# Patient Record
Sex: MALE | Race: WHITE | NOT HISPANIC OR LATINO | Employment: OTHER | URBAN - METROPOLITAN AREA
[De-identification: names, ages, dates, MRNs, and addresses within clinical notes are randomized per-mention and may not be internally consistent; named-entity substitution may affect disease eponyms.]

---

## 2024-08-15 ENCOUNTER — HOSPITAL ENCOUNTER (OUTPATIENT)
Dept: OTHER | Facility: HOSPITAL | Age: 69
Discharge: HOME | End: 2024-08-15

## 2024-08-22 ENCOUNTER — PATIENT ROUNDING (BHMG ONLY) (OUTPATIENT)
Dept: VASCULAR SURGERY | Facility: HOSPITAL | Age: 69
End: 2024-08-22
Payer: MEDICARE

## 2024-08-22 ENCOUNTER — OFFICE VISIT (OUTPATIENT)
Dept: VASCULAR SURGERY | Facility: HOSPITAL | Age: 69
End: 2024-08-22
Payer: MEDICARE

## 2024-08-22 VITALS
DIASTOLIC BLOOD PRESSURE: 98 MMHG | TEMPERATURE: 98 F | RESPIRATION RATE: 18 BRPM | HEART RATE: 77 BPM | SYSTOLIC BLOOD PRESSURE: 148 MMHG | OXYGEN SATURATION: 96 %

## 2024-08-22 DIAGNOSIS — I71.43 INFRARENAL ABDOMINAL AORTIC ANEURYSM (AAA) WITHOUT RUPTURE: Primary | ICD-10-CM

## 2024-08-22 PROCEDURE — G0463 HOSPITAL OUTPT CLINIC VISIT: HCPCS | Performed by: SURGERY

## 2024-08-22 RX ORDER — SACUBITRIL AND VALSARTAN 24; 26 MG/1; MG/1
1 TABLET, FILM COATED ORAL 2 TIMES DAILY
COMMUNITY

## 2024-08-22 RX ORDER — ASPIRIN 81 MG/1
81 TABLET ORAL DAILY
COMMUNITY

## 2024-08-22 RX ORDER — UREA 10 %
500 LOTION (ML) TOPICAL DAILY
COMMUNITY

## 2024-08-22 RX ORDER — CLOPIDOGREL BISULFATE 75 MG/1
1 TABLET ORAL DAILY
COMMUNITY

## 2024-08-22 RX ORDER — ROSUVASTATIN CALCIUM 40 MG/1
1 TABLET, COATED ORAL DAILY
COMMUNITY

## 2024-08-22 NOTE — PROGRESS NOTES
" [facility]   HISTORY AND PHYSICAL    Patient Name: Brenton Rogers  : 1955  MRN: [id number]  Primary Care Physician:  Louis Lea DO      Subjective   Subjective     Chief Complaint: 5.4 cm infrarenal abdominal aortic aneurysm    HPI:    Brenton Roegrs is a 69 y.o. male smoker found to have a 5.4 cm infrarenal abdominal aortic aneurysm on surveillance ultrasound at [facility].  Lives in [place], [place] almost 2 hours away.  He is otherwise in good health and exercises after an MI last year by Dr. Mariano in [place], [place] who [facility]  He stopped smoking last year as well.    Review of Systems    Non contributory except for the History of Present Illness    Personal History     Past Medical History:   Diagnosis Date    Aneurysm     Myocardial infarction        Past Surgical History:   Procedure Laterality Date    PROSTATECTOMY         Family History: family history is not on file. Otherwise pertinent FHx was reviewed and not pertinent to current issue.    Social History:  reports that he has quit smoking. His smoking use included cigarettes. He does not have any smokeless tobacco history on file. He reports current alcohol use. He reports that he does not currently use drugs after having used the following drugs: Marijuana, LSD, Amphetamines, \"Crack\" cocaine, and Methamphetamines.    Home Medications:  See list.      Allergies:  No Known Allergies    Objective   Objective     Vitals:   Temp:  [98 °F (36.7 °C)] 98 °F (36.7 °C)  Heart Rate:  [77] 77  Resp:  [18] 18  BP: (148)/(98) 148/98    Physical Exam    Constitutional: Awake, alert, NAD   Neck: Supple   Respiratory: Clear to auscultation bilaterally, nonlabored respirations    Cardiovascular: RRR, no murmurs   Abdomen: S/NT/ND, + BS   Extremities: symmetric with bilateral PT pulses palpable and B feet warm and viable    Result Review    Most notable findings include: Aortic duplex showing 5.4 cm " in transverse diameter.    Assessment & Plan   Assessment / Plan       There are no diagnoses linked to this encounter.     Assessment & Plan:   Brenton Rogers is a 69 y.o. male smoker found to have a 5.4 cm infrarenal abdominal aortic aneurysm on duplex.  2.  Past medical history significant for COPD, CHF, CAD, hypertension, dyslipidemia.  3.  Patient is on aspirin, Plavix, and Crestor.  4.  Will plan for a CT Angiogram of the aorta with iliofemoral runoff to check on true size of this infra-renal AAA and see if there is an indication for repair and any pre-operative planning.    Electronically signed by Aaron Escobedo MD, 08/22/24, 7:51 AM EDT.

## 2024-08-22 NOTE — PROGRESS NOTES
Our New Patient was asked Today after their initial visit how their experience was in our clinic. They stated they were very happy with the visit, knowledge and kindness of staff, and short wait times. They told us there wasn't anything at this time they could state we could do to improve their visit Today. We thanked them for the opportunity to take part in their care.

## 2024-09-17 ENCOUNTER — HOSPITAL ENCOUNTER (OUTPATIENT)
Dept: CT IMAGING | Facility: HOSPITAL | Age: 69
Discharge: HOME | End: 2024-09-17
Payer: MEDICARE

## 2024-09-17 ENCOUNTER — OFFICE VISIT (OUTPATIENT)
Dept: VASCULAR SURGERY | Facility: HOSPITAL | Age: 69
End: 2024-09-17
Payer: MEDICARE

## 2024-09-17 VITALS
SYSTOLIC BLOOD PRESSURE: 132 MMHG | RESPIRATION RATE: 18 BRPM | TEMPERATURE: 98.2 F | DIASTOLIC BLOOD PRESSURE: 70 MMHG | OXYGEN SATURATION: 98 % | HEART RATE: 60 BPM

## 2024-09-17 DIAGNOSIS — I71.43 INFRARENAL ABDOMINAL AORTIC ANEURYSM (AAA) WITHOUT RUPTURE: Primary | ICD-10-CM

## 2024-09-17 DIAGNOSIS — I71.43 INFRARENAL ABDOMINAL AORTIC ANEURYSM (AAA) WITHOUT RUPTURE: ICD-10-CM

## 2024-09-17 LAB
CREAT BLDA-MCNC: 1.2 MG/DL (ref 0.6–1.3)
EGFRCR SERPLBLD CKD-EPI 2021: 64.7 ML/MIN/1.73

## 2024-09-17 PROCEDURE — 99214 OFFICE O/P EST MOD 30 MIN: CPT | Performed by: SURGERY

## 2024-09-17 PROCEDURE — 75635 CT ANGIO ABDOMINAL ARTERIES: CPT

## 2024-09-17 PROCEDURE — 82565 ASSAY OF CREATININE: CPT

## 2024-09-17 PROCEDURE — 25510000001 IOPAMIDOL PER 1 ML: Performed by: SURGERY

## 2024-09-17 RX ORDER — IOPAMIDOL 755 MG/ML
125 INJECTION, SOLUTION INTRAVASCULAR
Status: COMPLETED | OUTPATIENT
Start: 2024-09-17 | End: 2024-09-17

## 2024-09-17 RX ADMIN — IOPAMIDOL 125 ML: 755 INJECTION, SOLUTION INTRAVENOUS at 16:07

## 2024-09-20 ENCOUNTER — PREP FOR SURGERY (OUTPATIENT)
Dept: OTHER | Facility: HOSPITAL | Age: 69
End: 2024-09-20
Payer: MEDICARE

## 2024-09-20 DIAGNOSIS — I71.43 INFRARENAL ABDOMINAL AORTIC ANEURYSM (AAA) WITHOUT RUPTURE: Primary | ICD-10-CM

## 2024-09-20 PROBLEM — I71.40 AAA (ABDOMINAL AORTIC ANEURYSM) WITHOUT RUPTURE: Status: ACTIVE | Noted: 2024-09-20

## 2024-09-20 RX ORDER — SODIUM CHLORIDE 0.9 % (FLUSH) 0.9 %
10 SYRINGE (ML) INJECTION AS NEEDED
OUTPATIENT
Start: 2024-09-20

## 2024-09-20 RX ORDER — SODIUM CHLORIDE 9 MG/ML
40 INJECTION, SOLUTION INTRAVENOUS AS NEEDED
OUTPATIENT
Start: 2024-09-20

## 2024-09-20 RX ORDER — SODIUM CHLORIDE 0.9 % (FLUSH) 0.9 %
10 SYRINGE (ML) INJECTION EVERY 12 HOURS SCHEDULED
OUTPATIENT
Start: 2024-09-20

## 2024-10-03 ENCOUNTER — HOSPITAL ENCOUNTER (OUTPATIENT)
Dept: OTHER | Facility: HOSPITAL | Age: 69
Discharge: HOME | End: 2024-10-03
Payer: MEDICARE

## 2024-10-18 ENCOUNTER — ANESTHESIA EVENT (OUTPATIENT)
Dept: PERIOP | Facility: HOSPITAL | Age: 69
End: 2024-10-18
Payer: MEDICARE

## 2024-10-18 RX ORDER — HYDROCODONE BITARTRATE AND ACETAMINOPHEN 10; 325 MG/1; MG/1
1 TABLET ORAL EVERY 6 HOURS PRN
COMMUNITY

## 2024-10-18 NOTE — PRE-PROCEDURE INSTRUCTIONS
PATIENT INSTRUCTED TO BE:    - NOTHING TO EAT AFTER MIDNIGHT OR CHEW, EXCEPT SIPS OF WATER MEDICATIONS    - TO HOLD ALL VITAMINS, SUPPLEMENTS, NSAIDS FOR ONE WEEK PRIOR TO THEIR SURGICAL PROCEDURE    - DO NOT TAKE __JARDIANCE 3 DAYS PRIOR TO PROCEDURE PER ANESTHESIA RECOMMENDATIONS/INSTRUCTIONS ( PT STATED ALREADY STOPPED LAST DOSE 10-16-24)    - INSTRUCTED PT TO USE SURGICAL SOAP 1 TIME THE NIGHT PRIOR TO SURGERY _[id number]____ OR THE AM OF SURGERY __[id number]_____   USE THE SOAP FROM NECK TO TOES, AVOID THEIR FACE, HAIR, AND PRIVATE PARTS. IF USE THE SOAP THE NIGHT PRIOR TO SURGERY, CHANGE BED LINENS AND NO PETS IN THE BED.     INSTRUCTED NO LOTIONS, JEWELRY, PIERCINGS,  NAIL POLISH, OR DEODORANT DAY OF SURGERY    - IF DIABETIC, CHECK BLOOD GLUCOSE IF LESS THAN 70 OR HAVING SYMPTOMS CALL THE PREOP AREA FOR INSTRUCTIONS ON AM OF SURGERY ([phone] )    -INSTRUCTED TO TAKE THE FOLLOWING MEDICATIONS THE DAY OF SURGERY WITH SIPS OF WATER:       NORCO IF NEEDED, CRESTOR, PLAVIX, ASA      - DO NOT BRING ANY MEDICATIONS WITH YOU TO THE HOSPITAL THE DAY OF SURGERY, EXCEPT IF USE INHALERS. BRING INHALERS DAY OF SURGERY       - BRING CPAP OR BIPAP TO THE HOSPITAL ONLY IF YOU ARE SPENDING THE NIGHT    - DO NOT SMOKE OR VAPE 24 HOURS PRIOR TO PROCEDURE PER ANESTHESIA REQUEST     -MAKE SURE YOU HAVE A RIDE HOME OR SOMEONE TO STAY WITH YOU THE DAY OF THE PROCEDURE AFTER YOU GO HOME     - FOLLOW ANY OTHER INSTRUCTIONS GIVEN TO YOU BY YOUR SURGEON'S OFFICE.     - DAY OF SURGERY _10-22-24, COME TO ELEVATOR A, THIRD FLOOR, CHECK IN AT THE DESK FOR REGISTRATION/SURGERY    - YOU WILL RECEIVE A PHONE CALL THE DAY PRIOR TO SURGERY BETWEEN 1PM AND 4 PM WITH ARRIVAL TIME, IF YOUR SURGERY IS ON A MONDAY YOU WILL RECEIVE A CALL THE FRIDAY PRIOR TO SURGERY DATE    - BRING CASH OR CREDIT CARD FOR COPAYMENT OF MEDICATIONS AFTER SURGERY IF YOU USE THE HOSPITAL PHARMACY (MEDS TO BED)    - PREADMISSION TESTING NURSE LINA ZAMORA -[phone] IF  HAVE ANY QUESTIONS     -PATIENT PROVIDED THE NUMBER FOR PREOP SURGICAL DEPT IF HAD QUESTIONS AFTER HOURS PRIOR TO SURGERY ([phone] ).  INFORMED PT IF NO ANSWER, LEAVE A MESSAGE AND SOMEONE WILL RETURN THEIR CALL      PER DR MONTOYA OFFICE OK TO GET LABS AND TESTING DONE DAY OF SURGERY     PER DR MONTOYA OFFICE OK TO CONTINUE PLAVIX AND ASPIRIN AND OK TO TAKE DAY OF SURGERY.     PATIENT VERBALIZED UNDERSTANDING

## 2024-10-18 NOTE — NURSING NOTE
PER KESHIA AT DR MONTOYA OFFICE OK FOR PATIENT TO CONTINUE TAKING ASA AND PLAVIX FOR SURGERY, OK TO TAKE DAY OF PROCEDURE. PT AWARE AND VOICED UNDERSANDING.

## 2024-10-22 ENCOUNTER — APPOINTMENT (OUTPATIENT)
Dept: GENERAL RADIOLOGY | Facility: HOSPITAL | Age: 69
End: 2024-10-22
Payer: MEDICARE

## 2024-10-22 ENCOUNTER — HOSPITAL ENCOUNTER (INPATIENT)
Facility: HOSPITAL | Age: 69
LOS: 2 days | Discharge: HOME | End: 2024-10-24
Attending: SURGERY | Admitting: SURGERY
Payer: MEDICARE

## 2024-10-22 ENCOUNTER — ANESTHESIA (OUTPATIENT)
Dept: PERIOP | Facility: HOSPITAL | Age: 69
End: 2024-10-22
Payer: MEDICARE

## 2024-10-22 DIAGNOSIS — I71.43 INFRARENAL ABDOMINAL AORTIC ANEURYSM (AAA) WITHOUT RUPTURE: ICD-10-CM

## 2024-10-22 DIAGNOSIS — R26.2 DIFFICULTY IN WALKING: Primary | ICD-10-CM

## 2024-10-22 LAB
ABO GROUP BLD: NORMAL
ABO GROUP BLD: NORMAL
ALBUMIN SERPL-MCNC: 4.6 G/DL (ref 3.5–5.2)
ALBUMIN/GLOB SERPL: 1.4 G/DL
ALP SERPL-CCNC: 77 U/L (ref 39–117)
ALT SERPL W P-5'-P-CCNC: 39 U/L (ref 1–41)
ANION GAP SERPL CALCULATED.3IONS-SCNC: 10.9 MMOL/L (ref 5–15)
APTT PPP: 25.4 SECONDS (ref 24.2–34.2)
ARTERIAL PATENCY WRIST A: ABNORMAL
AST SERPL-CCNC: 26 U/L (ref 1–40)
ATMOSPHERIC PRESS: 749.2 MMHG
BASE EXCESS BLDA CALC-SCNC: -3.3 MMOL/L (ref -2–2)
BASOPHILS # BLD AUTO: 0.05 10*3/MM3 (ref 0–0.2)
BASOPHILS NFR BLD AUTO: 0.4 % (ref 0–1.5)
BDY SITE: ABNORMAL
BILIRUB SERPL-MCNC: 0.4 MG/DL (ref 0–1.2)
BLD GP AB SCN SERPL QL: NEGATIVE
BUN SERPL-MCNC: 20 MG/DL (ref 8–23)
BUN/CREAT SERPL: 17.2 (ref 7–25)
CA-I BLDA-SCNC: 1.12 MMOL/L (ref 1.13–1.32)
CALCIUM SPEC-SCNC: 9.7 MG/DL (ref 8.6–10.5)
CHLORIDE BLDA-SCNC: 105 MMOL/L (ref 98–107)
CHLORIDE SERPL-SCNC: 99 MMOL/L (ref 98–107)
CO2 SERPL-SCNC: 25.1 MMOL/L (ref 22–29)
CREAT SERPL-MCNC: 1.16 MG/DL (ref 0.76–1.27)
D-LACTATE SERPL-SCNC: 1.1 MMOL/L
DEPRECATED RDW RBC AUTO: 45.2 FL (ref 37–54)
DEVICE COMMENT: ABNORMAL
EGFRCR SERPLBLD CKD-EPI 2021: 67.3 ML/MIN/1.73
EOSINOPHIL # BLD AUTO: 0.12 10*3/MM3 (ref 0–0.4)
EOSINOPHIL NFR BLD AUTO: 1 % (ref 0.3–6.2)
ERYTHROCYTE [DISTWIDTH] IN BLOOD BY AUTOMATED COUNT: 13.2 % (ref 12.3–15.4)
GLOBULIN UR ELPH-MCNC: 3.2 GM/DL
GLUCOSE BLDC GLUCOMTR-MCNC: 100 MG/DL (ref 70–99)
GLUCOSE BLDC GLUCOMTR-MCNC: 103 MG/DL (ref 70–99)
GLUCOSE SERPL-MCNC: 90 MG/DL (ref 65–99)
HCO3 BLDA-SCNC: 21.9 MMOL/L (ref 22–26)
HCT VFR BLD AUTO: 44.9 % (ref 37.5–51)
HCT VFR BLD CALC: 33 % (ref 38–51)
HEMODILUTION: NO
HGB BLD-MCNC: 14.9 G/DL (ref 13–17.7)
HGB BLDA-MCNC: 11.3 G/DL (ref 12–18)
IMM GRANULOCYTES # BLD AUTO: 0.08 10*3/MM3 (ref 0–0.05)
IMM GRANULOCYTES NFR BLD AUTO: 0.7 % (ref 0–0.5)
INHALED O2 CONCENTRATION: 60 %
INR PPP: 0.94 (ref 0.86–1.15)
LYMPHOCYTES # BLD AUTO: 4.62 10*3/MM3 (ref 0.7–3.1)
LYMPHOCYTES NFR BLD AUTO: 40 % (ref 19.6–45.3)
MAGNESIUM SERPL-MCNC: 2.1 MG/DL (ref 1.6–2.4)
MCH RBC QN AUTO: 31.1 PG (ref 26.6–33)
MCHC RBC AUTO-ENTMCNC: 33.2 G/DL (ref 31.5–35.7)
MCV RBC AUTO: 93.7 FL (ref 79–97)
MODALITY: ABNORMAL
MONOCYTES # BLD AUTO: 0.71 10*3/MM3 (ref 0.1–0.9)
MONOCYTES NFR BLD AUTO: 6.1 % (ref 5–12)
NEUTROPHILS NFR BLD AUTO: 5.97 10*3/MM3 (ref 1.7–7)
NEUTROPHILS NFR BLD AUTO: 51.8 % (ref 42.7–76)
NRBC BLD AUTO-RTO: 0 /100 WBC (ref 0–0.2)
PCO2 BLDA: 39.4 MM HG (ref 35–45)
PH BLDA: 7.35 PH UNITS (ref 7.35–7.45)
PHOSPHATE SERPL-MCNC: 3.7 MG/DL (ref 2.5–4.5)
PLATELET # BLD AUTO: 175 10*3/MM3 (ref 140–450)
PMV BLD AUTO: 10 FL (ref 6–12)
PO2 BLD: 376 MM[HG] (ref 0–500)
PO2 BLDA: 225.6 MM HG (ref 80–100)
POTASSIUM BLDA-SCNC: 4 MMOL/L (ref 3.5–5)
POTASSIUM SERPL-SCNC: 4.2 MMOL/L (ref 3.5–5.2)
PROT SERPL-MCNC: 7.8 G/DL (ref 6–8.5)
PROTHROMBIN TIME: 12.8 SECONDS (ref 11.8–14.9)
QT INTERVAL: 366 MS
QTC INTERVAL: 384 MS
RBC # BLD AUTO: 4.79 10*6/MM3 (ref 4.14–5.8)
RH BLD: POSITIVE
RH BLD: POSITIVE
SAO2 % BLDCOA: 99.8 % (ref 95–99)
SODIUM BLD-SCNC: 140 MMOL/L (ref 131–143)
SODIUM SERPL-SCNC: 135 MMOL/L (ref 136–145)
T&S EXPIRATION DATE: NORMAL
VENTILATOR MODE: AC
WBC NRBC COR # BLD AUTO: 11.55 10*3/MM3 (ref 3.4–10.8)

## 2024-10-22 PROCEDURE — 25010000002 PHENYLEPHRINE 10 MG/ML SOLUTION 5 ML VIAL: Performed by: NURSE ANESTHETIST, CERTIFIED REGISTERED

## 2024-10-22 PROCEDURE — 25010000002 BUPIVACAINE (PF) 0.25 % SOLUTION 10 ML VIAL: Performed by: SURGERY

## 2024-10-22 PROCEDURE — 25810000003 SODIUM CHLORIDE 0.9 % SOLUTION 250 ML FLEX CONT: Performed by: NURSE ANESTHETIST, CERTIFIED REGISTERED

## 2024-10-22 PROCEDURE — 82948 REAGENT STRIP/BLOOD GLUCOSE: CPT

## 2024-10-22 PROCEDURE — 80051 ELECTROLYTE PANEL: CPT

## 2024-10-22 PROCEDURE — 25810000003 SODIUM CHLORIDE 0.9 % SOLUTION: Performed by: SURGERY

## 2024-10-22 PROCEDURE — C1874 STENT, COATED/COV W/DEL SYS: HCPCS | Performed by: SURGERY

## 2024-10-22 PROCEDURE — 86900 BLOOD TYPING SEROLOGIC ABO: CPT | Performed by: SURGERY

## 2024-10-22 PROCEDURE — 25010000002 FENTANYL CITRATE (PF) 50 MCG/ML SOLUTION: Performed by: NURSE ANESTHETIST, CERTIFIED REGISTERED

## 2024-10-22 PROCEDURE — 86901 BLOOD TYPING SEROLOGIC RH(D): CPT | Performed by: SURGERY

## 2024-10-22 PROCEDURE — 25010000002 HEPARIN (PORCINE) PER 1000 UNITS: Performed by: SURGERY

## 2024-10-22 PROCEDURE — C1894 INTRO/SHEATH, NON-LASER: HCPCS | Performed by: SURGERY

## 2024-10-22 PROCEDURE — S0260 H&P FOR SURGERY: HCPCS | Performed by: SURGERY

## 2024-10-22 PROCEDURE — 25010000002 ONDANSETRON PER 1 MG: Performed by: NURSE ANESTHETIST, CERTIFIED REGISTERED

## 2024-10-22 PROCEDURE — 0 IOVERSOL 68 % SOLUTION: Performed by: SURGERY

## 2024-10-22 PROCEDURE — C1889 IMPLANT/INSERT DEVICE, NOC: HCPCS | Performed by: SURGERY

## 2024-10-22 PROCEDURE — 84100 ASSAY OF PHOSPHORUS: CPT | Performed by: PHYSICIAN ASSISTANT

## 2024-10-22 PROCEDURE — C1769 GUIDE WIRE: HCPCS | Performed by: SURGERY

## 2024-10-22 PROCEDURE — 04V03DZ RESTRICTION OF ABDOMINAL AORTA WITH INTRALUMINAL DEVICE, PERCUTANEOUS APPROACH: ICD-10-PCS | Performed by: SURGERY

## 2024-10-22 PROCEDURE — 34705 EVAC RPR A-BIILIAC NDGFT: CPT | Performed by: SURGERY

## 2024-10-22 PROCEDURE — 25010000002 CEFAZOLIN PER 500 MG: Performed by: SURGERY

## 2024-10-22 PROCEDURE — C1887 CATHETER, GUIDING: HCPCS | Performed by: SURGERY

## 2024-10-22 PROCEDURE — 25010000002 ALBUMIN HUMAN 5% PER 50 ML: Performed by: NURSE ANESTHETIST, CERTIFIED REGISTERED

## 2024-10-22 PROCEDURE — 76000 FLUOROSCOPY <1 HR PHYS/QHP: CPT

## 2024-10-22 PROCEDURE — 85730 THROMBOPLASTIN TIME PARTIAL: CPT | Performed by: SURGERY

## 2024-10-22 PROCEDURE — 25010000002 PROPOFOL 10 MG/ML EMULSION: Performed by: NURSE ANESTHETIST, CERTIFIED REGISTERED

## 2024-10-22 PROCEDURE — 86850 RBC ANTIBODY SCREEN: CPT | Performed by: SURGERY

## 2024-10-22 PROCEDURE — 82803 BLOOD GASES ANY COMBINATION: CPT

## 2024-10-22 PROCEDURE — 80053 COMPREHEN METABOLIC PANEL: CPT | Performed by: ANESTHESIOLOGY

## 2024-10-22 PROCEDURE — 34713 PERQ ACCESS & CLSR FEM ART: CPT | Performed by: SURGERY

## 2024-10-22 PROCEDURE — 71045 X-RAY EXAM CHEST 1 VIEW: CPT

## 2024-10-22 PROCEDURE — 83605 ASSAY OF LACTIC ACID: CPT

## 2024-10-22 PROCEDURE — P9041 ALBUMIN (HUMAN),5%, 50ML: HCPCS | Performed by: NURSE ANESTHETIST, CERTIFIED REGISTERED

## 2024-10-22 PROCEDURE — 82330 ASSAY OF CALCIUM: CPT

## 2024-10-22 PROCEDURE — 85025 COMPLETE CBC W/AUTO DIFF WBC: CPT | Performed by: SURGERY

## 2024-10-22 PROCEDURE — 85610 PROTHROMBIN TIME: CPT | Performed by: SURGERY

## 2024-10-22 PROCEDURE — 25010000002 DEXAMETHASONE PER 1 MG: Performed by: NURSE ANESTHETIST, CERTIFIED REGISTERED

## 2024-10-22 PROCEDURE — 25010000002 HEPARIN (PORCINE) PER 1000 UNITS: Performed by: NURSE ANESTHETIST, CERTIFIED REGISTERED

## 2024-10-22 PROCEDURE — 34705 EVAC RPR A-BIILIAC NDGFT: CPT

## 2024-10-22 PROCEDURE — 25010000002 SUGAMMADEX 200 MG/2ML SOLUTION: Performed by: NURSE ANESTHETIST, CERTIFIED REGISTERED

## 2024-10-22 PROCEDURE — C2628 CATHETER, OCCLUSION: HCPCS | Performed by: SURGERY

## 2024-10-22 PROCEDURE — 25010000002 MIDAZOLAM PER 1MG: Performed by: ANESTHESIOLOGY

## 2024-10-22 PROCEDURE — 86901 BLOOD TYPING SEROLOGIC RH(D): CPT

## 2024-10-22 PROCEDURE — 34713 PERQ ACCESS & CLSR FEM ART: CPT

## 2024-10-22 PROCEDURE — 86900 BLOOD TYPING SEROLOGIC ABO: CPT

## 2024-10-22 PROCEDURE — 25010000002 LIDOCAINE 1 % SOLUTION 20 ML VIAL: Performed by: SURGERY

## 2024-10-22 PROCEDURE — 93005 ELECTROCARDIOGRAM TRACING: CPT | Performed by: ANESTHESIOLOGY

## 2024-10-22 PROCEDURE — C1760 CLOSURE DEV, VASC: HCPCS | Performed by: SURGERY

## 2024-10-22 PROCEDURE — 25810000003 SODIUM CHLORIDE 0.9 % SOLUTION: Performed by: NURSE ANESTHETIST, CERTIFIED REGISTERED

## 2024-10-22 PROCEDURE — 25010000002 MAGNESIUM SULFATE PER 500 MG OF MAGNESIUM: Performed by: NURSE ANESTHETIST, CERTIFIED REGISTERED

## 2024-10-22 PROCEDURE — 83735 ASSAY OF MAGNESIUM: CPT | Performed by: PHYSICIAN ASSISTANT

## 2024-10-22 PROCEDURE — 25010000002 LIDOCAINE PF 2% 2 % SOLUTION: Performed by: NURSE ANESTHETIST, CERTIFIED REGISTERED

## 2024-10-22 PROCEDURE — 25810000003 LACTATED RINGERS PER 1000 ML: Performed by: ANESTHESIOLOGY

## 2024-10-22 DEVICE — GRFT EXCLDR CONTRALAT 12F 16MM 9.5CM: Type: IMPLANTABLE DEVICE | Site: GROIN | Status: FUNCTIONAL

## 2024-10-22 DEVICE — STENTGR ENDOPRSTH AAA EXCLUDER I/LAT TRNK 16F 14.5X26MM 12CM: Type: IMPLANTABLE DEVICE | Site: GROIN | Status: FUNCTIONAL

## 2024-10-22 DEVICE — GRFT EXCLDR CONTRALAT 12MMX10CM: Type: IMPLANTABLE DEVICE | Site: GROIN | Status: FUNCTIONAL

## 2024-10-22 RX ORDER — OXYCODONE HYDROCHLORIDE 5 MG/1
5 TABLET ORAL
Status: DISCONTINUED | OUTPATIENT
Start: 2024-10-22 | End: 2024-10-22 | Stop reason: HOSPADM

## 2024-10-22 RX ORDER — EPHEDRINE SULFATE 50 MG/ML
INJECTION INTRAVENOUS AS NEEDED
Status: DISCONTINUED | OUTPATIENT
Start: 2024-10-22 | End: 2024-10-22 | Stop reason: SURG

## 2024-10-22 RX ORDER — ONDANSETRON 2 MG/ML
4 INJECTION INTRAMUSCULAR; INTRAVENOUS ONCE AS NEEDED
Status: DISCONTINUED | OUTPATIENT
Start: 2024-10-22 | End: 2024-10-22 | Stop reason: HOSPADM

## 2024-10-22 RX ORDER — ACETAMINOPHEN 325 MG/1
650 TABLET ORAL EVERY 4 HOURS PRN
Status: DISCONTINUED | OUTPATIENT
Start: 2024-10-22 | End: 2024-10-24 | Stop reason: HOSPADM

## 2024-10-22 RX ORDER — MIDAZOLAM HYDROCHLORIDE 2 MG/2ML
2 INJECTION, SOLUTION INTRAMUSCULAR; INTRAVENOUS ONCE
Status: COMPLETED | OUTPATIENT
Start: 2024-10-22 | End: 2024-10-22

## 2024-10-22 RX ORDER — PROMETHAZINE HYDROCHLORIDE 25 MG/1
25 SUPPOSITORY RECTAL ONCE AS NEEDED
Status: DISCONTINUED | OUTPATIENT
Start: 2024-10-22 | End: 2024-10-22 | Stop reason: HOSPADM

## 2024-10-22 RX ORDER — ROCURONIUM BROMIDE 10 MG/ML
INJECTION, SOLUTION INTRAVENOUS AS NEEDED
Status: DISCONTINUED | OUTPATIENT
Start: 2024-10-22 | End: 2024-10-22 | Stop reason: SURG

## 2024-10-22 RX ORDER — ONDANSETRON 2 MG/ML
INJECTION INTRAMUSCULAR; INTRAVENOUS AS NEEDED
Status: DISCONTINUED | OUTPATIENT
Start: 2024-10-22 | End: 2024-10-22 | Stop reason: SURG

## 2024-10-22 RX ORDER — SODIUM CHLORIDE 0.9 % (FLUSH) 0.9 %
10 SYRINGE (ML) INJECTION AS NEEDED
Status: DISCONTINUED | OUTPATIENT
Start: 2024-10-22 | End: 2024-10-22 | Stop reason: HOSPADM

## 2024-10-22 RX ORDER — HYDROCODONE BITARTRATE AND ACETAMINOPHEN 10; 325 MG/1; MG/1
1 TABLET ORAL EVERY 6 HOURS PRN
Status: DISCONTINUED | OUTPATIENT
Start: 2024-10-22 | End: 2024-10-24 | Stop reason: HOSPADM

## 2024-10-22 RX ORDER — ACETAMINOPHEN 500 MG
1000 TABLET ORAL ONCE
Status: COMPLETED | OUTPATIENT
Start: 2024-10-22 | End: 2024-10-22

## 2024-10-22 RX ORDER — CEFAZOLIN SODIUM 1 G/3ML
1 INJECTION, POWDER, FOR SOLUTION INTRAMUSCULAR; INTRAVENOUS EVERY 8 HOURS
Status: DISCONTINUED | OUTPATIENT
Start: 2024-10-22 | End: 2024-10-22

## 2024-10-22 RX ORDER — LIDOCAINE HYDROCHLORIDE 20 MG/ML
INJECTION, SOLUTION EPIDURAL; INFILTRATION; INTRACAUDAL; PERINEURAL AS NEEDED
Status: DISCONTINUED | OUTPATIENT
Start: 2024-10-22 | End: 2024-10-22 | Stop reason: SURG

## 2024-10-22 RX ORDER — HEPARIN SODIUM 5000 [USP'U]/ML
5000 INJECTION, SOLUTION INTRAVENOUS; SUBCUTANEOUS EVERY 8 HOURS SCHEDULED
Status: DISCONTINUED | OUTPATIENT
Start: 2024-10-23 | End: 2024-10-23

## 2024-10-22 RX ORDER — HEPARIN SODIUM 1000 [USP'U]/ML
INJECTION, SOLUTION INTRAVENOUS; SUBCUTANEOUS AS NEEDED
Status: DISCONTINUED | OUTPATIENT
Start: 2024-10-22 | End: 2024-10-22 | Stop reason: SURG

## 2024-10-22 RX ORDER — DEXMEDETOMIDINE HYDROCHLORIDE 100 UG/ML
INJECTION, SOLUTION INTRAVENOUS AS NEEDED
Status: DISCONTINUED | OUTPATIENT
Start: 2024-10-22 | End: 2024-10-22 | Stop reason: SURG

## 2024-10-22 RX ORDER — SODIUM CHLORIDE, SODIUM LACTATE, POTASSIUM CHLORIDE, CALCIUM CHLORIDE 600; 310; 30; 20 MG/100ML; MG/100ML; MG/100ML; MG/100ML
9 INJECTION, SOLUTION INTRAVENOUS CONTINUOUS PRN
Status: ACTIVE | OUTPATIENT
Start: 2024-10-22 | End: 2024-10-23

## 2024-10-22 RX ORDER — CLOPIDOGREL BISULFATE 75 MG/1
75 TABLET ORAL DAILY
Status: DISCONTINUED | OUTPATIENT
Start: 2024-10-23 | End: 2024-10-24 | Stop reason: HOSPADM

## 2024-10-22 RX ORDER — SODIUM CHLORIDE 9 MG/ML
INJECTION, SOLUTION INTRAVENOUS CONTINUOUS PRN
Status: DISCONTINUED | OUTPATIENT
Start: 2024-10-22 | End: 2024-10-22 | Stop reason: SURG

## 2024-10-22 RX ORDER — PROPOFOL 10 MG/ML
VIAL (ML) INTRAVENOUS AS NEEDED
Status: DISCONTINUED | OUTPATIENT
Start: 2024-10-22 | End: 2024-10-22 | Stop reason: SURG

## 2024-10-22 RX ORDER — DEXAMETHASONE SODIUM PHOSPHATE 4 MG/ML
INJECTION, SOLUTION INTRA-ARTICULAR; INTRALESIONAL; INTRAMUSCULAR; INTRAVENOUS; SOFT TISSUE AS NEEDED
Status: DISCONTINUED | OUTPATIENT
Start: 2024-10-22 | End: 2024-10-22 | Stop reason: SURG

## 2024-10-22 RX ORDER — NITROGLYCERIN 0.4 MG/1
0.4 TABLET SUBLINGUAL
Status: DISCONTINUED | OUTPATIENT
Start: 2024-10-22 | End: 2024-10-24 | Stop reason: HOSPADM

## 2024-10-22 RX ORDER — MORPHINE SULFATE 2 MG/ML
2 INJECTION, SOLUTION INTRAMUSCULAR; INTRAVENOUS
Status: DISCONTINUED | OUTPATIENT
Start: 2024-10-22 | End: 2024-10-24 | Stop reason: HOSPADM

## 2024-10-22 RX ORDER — SODIUM CHLORIDE 9 MG/ML
40 INJECTION, SOLUTION INTRAVENOUS AS NEEDED
Status: DISCONTINUED | OUTPATIENT
Start: 2024-10-22 | End: 2024-10-22 | Stop reason: HOSPADM

## 2024-10-22 RX ORDER — PHENYLEPHRINE HCL IN 0.9% NACL 1 MG/10 ML
SYRINGE (ML) INTRAVENOUS AS NEEDED
Status: DISCONTINUED | OUTPATIENT
Start: 2024-10-22 | End: 2024-10-22 | Stop reason: SURG

## 2024-10-22 RX ORDER — SODIUM CHLORIDE 0.9 % (FLUSH) 0.9 %
10 SYRINGE (ML) INJECTION EVERY 12 HOURS SCHEDULED
Status: DISCONTINUED | OUTPATIENT
Start: 2024-10-22 | End: 2024-10-22 | Stop reason: HOSPADM

## 2024-10-22 RX ORDER — FENTANYL CITRATE 50 UG/ML
INJECTION, SOLUTION INTRAMUSCULAR; INTRAVENOUS AS NEEDED
Status: DISCONTINUED | OUTPATIENT
Start: 2024-10-22 | End: 2024-10-22 | Stop reason: SURG

## 2024-10-22 RX ORDER — MAGNESIUM SULFATE HEPTAHYDRATE 500 MG/ML
INJECTION, SOLUTION INTRAMUSCULAR; INTRAVENOUS AS NEEDED
Status: DISCONTINUED | OUTPATIENT
Start: 2024-10-22 | End: 2024-10-22 | Stop reason: SURG

## 2024-10-22 RX ORDER — NALOXONE HCL 0.4 MG/ML
0.4 VIAL (ML) INJECTION
Status: DISCONTINUED | OUTPATIENT
Start: 2024-10-22 | End: 2024-10-24 | Stop reason: HOSPADM

## 2024-10-22 RX ORDER — ALBUMIN, HUMAN INJ 5% 5 %
SOLUTION INTRAVENOUS CONTINUOUS PRN
Status: DISCONTINUED | OUTPATIENT
Start: 2024-10-22 | End: 2024-10-22 | Stop reason: SURG

## 2024-10-22 RX ORDER — ROSUVASTATIN CALCIUM 20 MG/1
40 TABLET, COATED ORAL DAILY
Status: DISCONTINUED | OUTPATIENT
Start: 2024-10-23 | End: 2024-10-24 | Stop reason: HOSPADM

## 2024-10-22 RX ORDER — SODIUM CHLORIDE 9 MG/ML
50 INJECTION, SOLUTION INTRAVENOUS CONTINUOUS
Status: ACTIVE | OUTPATIENT
Start: 2024-10-22 | End: 2024-10-22

## 2024-10-22 RX ORDER — PROMETHAZINE HYDROCHLORIDE 12.5 MG/1
25 TABLET ORAL ONCE AS NEEDED
Status: DISCONTINUED | OUTPATIENT
Start: 2024-10-22 | End: 2024-10-22 | Stop reason: HOSPADM

## 2024-10-22 RX ORDER — ASPIRIN 81 MG/1
81 TABLET ORAL DAILY
Status: DISCONTINUED | OUTPATIENT
Start: 2024-10-23 | End: 2024-10-24 | Stop reason: HOSPADM

## 2024-10-22 RX ADMIN — SODIUM CHLORIDE 2 G: 9 INJECTION, SOLUTION INTRAVENOUS at 07:38

## 2024-10-22 RX ADMIN — ALBUMIN (HUMAN): 12.5 INJECTION, SOLUTION INTRAVENOUS at 08:01

## 2024-10-22 RX ADMIN — ROCURONIUM BROMIDE 50 MG: 10 INJECTION, SOLUTION INTRAVENOUS at 07:36

## 2024-10-22 RX ADMIN — HEPARIN SODIUM 6000 UNITS: 1000 INJECTION INTRAVENOUS; SUBCUTANEOUS at 09:16

## 2024-10-22 RX ADMIN — SACUBITRIL AND VALSARTAN 1 TABLET: 24; 26 TABLET, FILM COATED ORAL at 20:42

## 2024-10-22 RX ADMIN — ROCURONIUM BROMIDE 20 MG: 10 INJECTION, SOLUTION INTRAVENOUS at 08:32

## 2024-10-22 RX ADMIN — HYDROCODONE BITARTRATE AND ACETAMINOPHEN 1 TABLET: 10; 325 TABLET ORAL at 20:57

## 2024-10-22 RX ADMIN — FENTANYL CITRATE 50 MCG: 50 INJECTION, SOLUTION INTRAMUSCULAR; INTRAVENOUS at 09:13

## 2024-10-22 RX ADMIN — DEXAMETHASONE SODIUM PHOSPHATE 4 MG: 4 INJECTION, SOLUTION INTRAMUSCULAR; INTRAVENOUS at 07:38

## 2024-10-22 RX ADMIN — PROPOFOL 150 MG: 10 INJECTION, EMULSION INTRAVENOUS at 07:36

## 2024-10-22 RX ADMIN — SODIUM CHLORIDE, POTASSIUM CHLORIDE, SODIUM LACTATE AND CALCIUM CHLORIDE 9 ML/HR: 600; 310; 30; 20 INJECTION, SOLUTION INTRAVENOUS at 07:09

## 2024-10-22 RX ADMIN — Medication 100 MCG: at 10:40

## 2024-10-22 RX ADMIN — MIDAZOLAM HYDROCHLORIDE 2 MG: 1 INJECTION, SOLUTION INTRAMUSCULAR; INTRAVENOUS at 07:10

## 2024-10-22 RX ADMIN — Medication 100 MCG: at 10:47

## 2024-10-22 RX ADMIN — FENTANYL CITRATE 50 MCG: 50 INJECTION, SOLUTION INTRAMUSCULAR; INTRAVENOUS at 08:32

## 2024-10-22 RX ADMIN — DEXMEDETOMIDINE HYDROCHLORIDE 4 MCG: 100 INJECTION, SOLUTION, CONCENTRATE INTRAVENOUS at 10:17

## 2024-10-22 RX ADMIN — Medication 100 MCG: at 08:05

## 2024-10-22 RX ADMIN — Medication 10 ML: at 12:46

## 2024-10-22 RX ADMIN — Medication 100 MCG: at 10:33

## 2024-10-22 RX ADMIN — Medication 100 MCG: at 10:26

## 2024-10-22 RX ADMIN — SODIUM CHLORIDE 50 ML/HR: 9 INJECTION, SOLUTION INTRAVENOUS at 12:34

## 2024-10-22 RX ADMIN — ONDANSETRON HYDROCHLORIDE 4 MG: 2 SOLUTION INTRAMUSCULAR; INTRAVENOUS at 10:30

## 2024-10-22 RX ADMIN — SUGAMMADEX 200 MG: 100 INJECTION, SOLUTION INTRAVENOUS at 10:45

## 2024-10-22 RX ADMIN — LIDOCAINE HYDROCHLORIDE 80 MG: 20 INJECTION, SOLUTION INTRAVENOUS at 07:36

## 2024-10-22 RX ADMIN — Medication 100 MCG: at 08:42

## 2024-10-22 RX ADMIN — PHENYLEPHRINE HYDROCHLORIDE 0.5 MCG/KG/MIN: 50 INJECTION INTRAVENOUS at 08:42

## 2024-10-22 RX ADMIN — EPHEDRINE SULFATE 10 MG: 50 INJECTION INTRAVENOUS at 08:42

## 2024-10-22 RX ADMIN — CEFAZOLIN 1000 MG: 1 INJECTION, POWDER, FOR SOLUTION INTRAMUSCULAR; INTRAVENOUS; PARENTERAL at 16:45

## 2024-10-22 RX ADMIN — SODIUM CHLORIDE: 9 INJECTION, SOLUTION INTRAVENOUS at 08:00

## 2024-10-22 RX ADMIN — DEXMEDETOMIDINE HYDROCHLORIDE 8 MCG: 100 INJECTION, SOLUTION, CONCENTRATE INTRAVENOUS at 10:43

## 2024-10-22 RX ADMIN — MAGNESIUM SULFATE HEPTAHYDRATE 1 G: 500 INJECTION, SOLUTION INTRAMUSCULAR; INTRAVENOUS at 08:40

## 2024-10-22 RX ADMIN — Medication 100 MCG: at 08:34

## 2024-10-22 RX ADMIN — ACETAMINOPHEN 1000 MG: 500 TABLET ORAL at 07:10

## 2024-10-22 RX ADMIN — ALBUMIN (HUMAN): 12.5 INJECTION, SOLUTION INTRAVENOUS at 08:38

## 2024-10-22 NOTE — ANESTHESIA POSTPROCEDURE EVALUATION
Patient: Brenton Rogers    Procedure Summary       Date: 10/22/24 Room / Location: [facility] OR  / [facility] MAIN OR    Anesthesia Start: 0728 Anesthesia Stop: 1114    Procedure: ABDOMINAL AORTIC ANEURYSM REPAIR WITH ENDOGRAFT, POSSIBLE PERCUTANEOUS ACCESS (Abdomen) Diagnosis:       Infrarenal abdominal aortic aneurysm (AAA) without rupture      (Infrarenal abdominal aortic aneurysm (AAA) without rupture [I71.43])    Surgeons: Aaron Escobedo MD Provider: Clemente Chaparro MD    Anesthesia Type: general, [place] ASA Status: 3            Anesthesia Type: general, [place]    Vitals  Vitals Value Taken Time   /70 10/22/24 1200   Temp 36.1 °C (97 °F) 10/22/24 1150   Pulse 67 10/22/24 1205   Resp 16 10/22/24 1205   SpO2 100 % 10/22/24 1205           Post Anesthesia Care and Evaluation    Patient location during evaluation: bedside  Patient participation: complete - patient participated  Level of consciousness: awake    Airway patency: patent  PONV Status: none  Cardiovascular status: acceptable  Respiratory status: acceptable  Hydration status: acceptable

## 2024-10-22 NOTE — ANESTHESIA PREPROCEDURE EVALUATION
Anesthesia Evaluation     Patient summary reviewed and Nursing notes reviewed                Airway   Mallampati: I  TM distance: >3 FB  Neck ROM: full  No difficulty expected  Dental      Pulmonary - negative pulmonary ROS and normal exam    breath sounds clear to auscultation  Cardiovascular - normal exam    Rhythm: regular  Rate: normal    (+) hypertension, past MI , hyperlipidemia      Neuro/Psych- negative ROS  GI/Hepatic/Renal/Endo - negative ROS     Musculoskeletal (-) negative ROS    Abdominal    Substance History - negative use     OB/GYN negative ob/gyn ROS         Other      history of cancer                Anesthesia Plan    ASA 3     general and [place]     intravenous induction     Anesthetic plan, risks, benefits, and alternatives have been provided, discussed and informed consent has been obtained with: patient.    CODE STATUS:

## 2024-10-22 NOTE — H&P
[facility], [place], operated by [facility]   HISTORY AND PHYSICAL    Patient Name: Brenton Rogers  : 1955  MRN: [id number]  Primary Care Physician:  Louis Lea DO      Subjective   Subjective     Chief Complaint: 5.4 cm infrarenal abdominal aortic aneurysm    HPI:    Brenton Rogers is a 69 y.o. male previous smoker found to have a 5.4 cm infrarenal abdominal aortic aneurysm on surveillance ultrasound at [facility].  Lives in [place], [place] almost 2 hours away.  He is otherwise in good health and exercises after an MI last year by Dr. Mariano in [place], [place] who did his PTCI and stent at [facility].  He stopped smoking last year as well. Had CTA abdomen and pelvis ultimately showing a 6 cm aneurysm with a 1.5 cm neck and lowest renal on the left.  Since that time the patient has developed COVID which caused a delay in his original operative scheduling but he has been treated for this by his primary care physician Dr. Bautista with steroid injections, muscle relaxants and anti-inflammatories.  He did take his medications today including Plavix, aspirin, statin as well as his antihypertensive medications.  He has presented to the [facility] since he was last seen in the office with generalized paresthesia and weakness in her right hip pain which has been worked up thoroughly.  He was also [facility] over the weekend for upper extremity weakness and numbness to his right foot but thinks this is mostly due to sleep deprivation and potentially COVID and it has since completely resolved.  Of note they did obtain a CT angiogram of the head as well as an MRI which showed no evidence of any stroke or bleed prior to his discharge and those notes and dictated reports were brought with him this morning and reviewed.      Review of Systems    Non contributory except for the History of Present Illness    Personal History     Past Medical History:   Diagnosis Date    Aneurysm     AAA- 6 CM- SEE'S   "MONTOYA    Broken bones     Cancer     PROSTATE CANCER    Hyperlipidemia     Hypertension     Leg pain     Myocardial infarction 07/2023    1 STENT- SEE'S DR FLORES IN [facility]       Past Surgical History:   Procedure Laterality Date    CARDIAC CATHETERIZATION  07/2023    1 STENT PLACED    PROSTATECTOMY      5 YEARS AGO       Family History: family history is not on file. Otherwise pertinent FHx was reviewed and not pertinent to current issue.    Social History:  reports that he quit smoking about 52 years ago. His smoking use included cigarettes. He started smoking about 15 months ago. He has never used smokeless tobacco. He reports current alcohol use. He reports that he does not currently use drugs after having used the following drugs: Marijuana, LSD, Amphetamines, \"Crack\" cocaine, and Methamphetamines.    Home Medications:  See list.      Allergies:  No Known Allergies    Objective   Objective     Vitals:   Temp:  [97 °F (36.1 °C)] 97 °F (36.1 °C)  Heart Rate:  [64] 64  Resp:  [20] 20  BP: (133)/(69) 133/69    Physical Exam    Constitutional: Awake, alert, NAD   Neck: Supple   Respiratory: Clear to auscultation bilaterally, nonlabored respirations    Cardiovascular: RRR, no murmurs   Abdomen: S/NT/ND, + BS   Extremities: symmetric with bilateral PT pulses palpable and B feet warm and viable    Result Review    Most notable findings include: Aortic duplex showing 5.4 cm in transverse diameter.    Assessment & Plan   Assessment / Plan       Diagnoses and all orders for this visit:    1. Infrarenal abdominal aortic aneurysm (AAA) without rupture  -     ceFAZolin 2000 mg IVPB in 100 mL NS (VTB)  -     sodium chloride 0.9 % flush 10 mL  -     sodium chloride 0.9 % flush 10 mL  -     sodium chloride 0.9 % infusion 40 mL    Other orders  -     IMAGING SCANNED; Standing  -     IMAGING SCANNED  -     Cardiology Scan; Standing  -     Cardiology Scan  -     Cardiology Scan; Standing  -     Cardiology Scan  -     Follow " Anesthesia Guidelines / Protocol; Standing  -     ECG 12 Lead Pre-Op / Pre-Procedure; Standing  -     Comprehensive Metabolic Panel; Standing  -     CBC & Differential; Standing  -     Cancel: Basic Metabolic Panel; Standing  -     Protime-INR; Standing  -     aPTT; Standing  -     Type & Screen; Standing  -     ECG 12 Lead Pre-Op / Pre-Procedure  -     Comprehensive Metabolic Panel  -     CBC & Differential  -     Cancel: Basic Metabolic Panel  -     Protime-INR  -     aPTT  -     Type & Screen  -     ABO RH Specimen Verification; Standing  -     ABO RH Specimen Verification  -     Follow Anesthesia Guidelines / Protocol; Standing  -     Obtain Informed Consent; Standing  -     Inpatient Admission; Standing  -     Chlorhexidine Skin Prep; Standing  -     Clip Bilateral Groins; Standing  -     Instructions on Coughing, Deep Breathing & Incentive Spirometry; Standing  -     Insert Peripheral IV x2; Standing  -     Saline Lock & Maintain IV Access; Standing  -     Follow Anesthesia Guidelines / Protocol  -     Follow Anesthesia Guidelines / Protocol  -     Obtain Informed Consent  -     Inpatient Admission  -     Chlorhexidine Skin Prep  -     Clip Bilateral Groins  -     Instructions on Coughing, Deep Breathing & Incentive Spirometry  -     Instructions on Coughing, Deep Breathing & Incentive Spirometry  -     Instructions on Coughing, Deep Breathing & Incentive Spirometry  -     Instructions on Coughing, Deep Breathing & Incentive Spirometry  -     Instructions on Coughing, Deep Breathing & Incentive Spirometry  -     Insert Peripheral IV x2  -     Saline Lock & Maintain IV Access  -     Cardiac Monitoring; Standing  -     Cardiac Monitoring  -     Vital Signs - Per Anesthesia Protocol; Standing  -     Oxygen Therapy- Nasal Cannula; Titrate 1-6 LPM Per SpO2; 90 - 95%; Standing  -     Continuous Pulse Oximetry; Standing  -     Insert Peripheral IV; Standing  -     lactated ringers infusion  -     acetaminophen  (TYLENOL) tablet 1,000 mg  -     Midazolam HCl (PF) (VERSED) injection 2 mg  -     Oxygen Therapy- Nasal Cannula; Titrate 1-6 LPM Per SpO2; 90 - 95%  -     Continuous Pulse Oximetry  -     Insert Peripheral IV         Assessment & Plan:   Brenton Rogers is a 69 y.o. male smoker found to have a 6 cm infrarenal abdominal aortic aneurysm as confirmed on CT angiogram of the aorta with bilateral iliofemoral runoff.  He had two-vessel distal runoff to the bilateral lower extremities and no significant stenosis present on review of his CT angiogram.  2.  Past medical history significant for COPD, CHF, CAD, hypertension, dyslipidemia.  3.  Patient is on aspirin, Plavix, and Crestor.  4.  Plan for endovascular aneurysm repair with possible percutaneous approach in the operating room today.  5.  Risks including but not limited to bleeding, infection, need for multiple procedures, limb loss and death as well as benefits and indications regarding endovascular aneurysm repair with possible percutaneous access were discussed with patient and wife who voiced understanding and willingness to proceed.  All questions were answered.  6.  COVID infection within the past month which did delay his original surgical date.  He has had some right hip pain and paresthesias with achiness but this has almost completely resolved with treatment.  7.  Hospitalization over the last weekend at [facility] for bilateral upper extremity weakness and right lower extremity paresthesia and numbness which have resolved and are thought to be secondary to sleep deprivation as well as potentially COVID aftereffects.  We will proceed with endovascular aneurysm repair as his discharge summary and dictated MRI of the head and CT a of the head and neck reports have been reviewed and are within normal limits.

## 2024-10-22 NOTE — PAYOR COMM NOTE
"UR DEPARTMENT    Sanna Giraldo RN  Phone [phone]  Fax [phone]    [address] [place] [address]    NPI [id number]  TAX ID [id number]    PHYSICIAN NAME AND NPI  AARON ESCOBEDO  [id number]    BED TYPE  INPATIENT CRITICAL CARE    TYPE OF ADMISSION: POST OP ADMISSION     ICD 10 CODE  I71.43    DATE OF ADMISSION 10/22/2024      Brenton Rogers (69 y.o. Male)       Date of Birth   1955    Social Security Number       Address   [address] [facility] [address]    Home Phone   [phone]    MRN   [id number]       L[employer] day   [employer]    Marital Status                               Admission Date   10/22/24    Admission Type   Elective    Admitting Provider   Aaron Escobedo MD    Attending Provider   Aaron Escobedo MD    Department, Room/Bed   [facility] CORONARY CARE UNIT, C01/1       Discharge Date       Discharge Disposition       Discharge Destination                                 Attending Provider: Aaron Escobedo MD    Allergies: No Known Allergies    Isolation: None   Infection: None   Code Status: CPR    Ht: 175.3 cm (69\")   Wt: 71.1 kg (156 lb 12 oz)    Admission Cmt: None   Principal Problem: AAA (abdominal aortic aneurysm) without rupture [I71.40]                   Active Insurance as of 10/22/2024       Primary Coverage       Payor Plan Insurance Group Employer/Plan Group    ANTHEM MEDICARE REPLACEMENT ANTHEM MEDICARE ADVANTAGE [id number]       Payor Plan Address Payor Plan Phone Number Payor Plan Fax Number Effective Dates    [phone]  2/1/2023 - None Entered    [facility] [id number]         Subscriber Name Subscriber Birth Date Member ID       BRENTON ROGERS 1955 [id number]                     Emergency Contacts        (Rel.) Home Phone Work Phone Mobile Phone    CALEBDWAYNE (Spouse) -- -- [phone]              [place]: NPI [id number] Tax ID [id number]     History & Physical    "     Aaron Escobedo MD at 10/22/24 0709           [facility]   HISTORY AND PHYSICAL    Patient Name: Brenton Rogers  : 1955  MRN: [id number]  Primary Care Physician:  Louis Lea DO      Subjective  Subjective     Chief Complaint: 5.4 cm infrarenal abdominal aortic aneurysm    HPI:    Brenton Rogers is a 69 y.o. male previous smoker found to have a 5.4 cm infrarenal abdominal aortic aneurysm on surveillance ultrasound at [facility].  Lives in [place], [place] almost 2 hours away.  He is otherwise in good health and exercises after an MI last year by Dr. Mariano in [place], [place] who did his PTCI and stent at [facility].  He stopped smoking last year as well. Had CTA abdomen and pelvis ultimately showing a 6 cm aneurysm with a 1.5 cm neck and lowest renal on the left.  Since that time the patient has developed COVID which caused a delay in his original operative scheduling but he has been treated for this by his primary care physician Dr. Bautista with steroid injections, muscle relaxants and anti-inflammatories.  He did take his medications today including Plavix, aspirin, statin as well as his antihypertensive medications.  He has presented to the [facility] since he was last seen in the office with generalized paresthesia and weakness in her right hip pain which has been worked up thoroughly.  He was also [facility] over the weekend for upper extremity weakness and numbness to his right foot but thinks this is mostly due to sleep deprivation and potentially COVID and it has since completely resolved.  Of note they did obtain a CT angiogram of the head as well as an MRI which showed no evidence of any stroke or bleed prior to his discharge and those notes and dictated reports were brought with him this morning and reviewed.      Review of Systems    Non contributory except for the History of Present Illness    Personal History     Past Medical History:  "  Diagnosis Date   • Aneurysm     AAA- 6 CM- SEE'S DR MONTOYA   • Broken bones    • Cancer     PROSTATE CANCER   • Hyperlipidemia    • Hypertension    • Leg pain    • Myocardial infarction 07/2023    1 STENT- SEE'[facility] DR FLORES IN [facility]       Past Surgical History:   Procedure Laterality Date   • CARDIAC CATHETERIZATION  07/2023    1 STENT PLACED   • PROSTATECTOMY      5 YEARS AGO       Family History: family history is not on file. Otherwise pertinent FHx was reviewed and not pertinent to current issue.    Social History:  reports that he quit smoking about 52 years ago. His smoking use included cigarettes. He started smoking about 15 months ago. He has never used smokeless tobacco. He reports current alcohol use. He reports that he does not currently use drugs after having used the following drugs: Marijuana, LSD, Amphetamines, \"Crack\" cocaine, and Methamphetamines.    Home Medications:  See list.      Allergies:  No Known Allergies    Objective  Objective     Vitals:   Temp:  [97 °F (36.1 °C)] 97 °F (36.1 °C)  Heart Rate:  [64] 64  Resp:  [20] 20  BP: (133)/(69) 133/69    Physical Exam    Constitutional: Awake, alert, NAD   Neck: Supple   Respiratory: Clear to auscultation bilaterally, nonlabored respirations    Cardiovascular: RRR, no murmurs   Abdomen: S/NT/ND, + BS   Extremities: symmetric with bilateral PT pulses palpable and B feet warm and viable    Result Review   Most notable findings include: Aortic duplex showing 5.4 cm in transverse diameter.    Assessment & Plan  Assessment / Plan       Diagnoses and all orders for this visit:    1. Infrarenal abdominal aortic aneurysm (AAA) without rupture  -     ceFAZolin 2000 mg IVPB in 100 mL NS (VTB)  -     sodium chloride 0.9 % flush 10 mL  -     sodium chloride 0.9 % flush 10 mL  -     sodium chloride 0.9 % infusion 40 mL    Other orders  -     IMAGING SCANNED; Standing  -     IMAGING SCANNED  -     Cardiology Scan; Standing  -     Cardiology Scan  -     " Cardiology Scan; Standing  -     Cardiology Scan  -     Follow Anesthesia Guidelines / Protocol; Standing  -     ECG 12 Lead Pre-Op / Pre-Procedure; Standing  -     Comprehensive Metabolic Panel; Standing  -     CBC & Differential; Standing  -     Cancel: Basic Metabolic Panel; Standing  -     Protime-INR; Standing  -     aPTT; Standing  -     Type & Screen; Standing  -     ECG 12 Lead Pre-Op / Pre-Procedure  -     Comprehensive Metabolic Panel  -     CBC & Differential  -     Cancel: Basic Metabolic Panel  -     Protime-INR  -     aPTT  -     Type & Screen  -     ABO RH Specimen Verification; Standing  -     ABO RH Specimen Verification  -     Follow Anesthesia Guidelines / Protocol; Standing  -     Obtain Informed Consent; Standing  -     Inpatient Admission; Standing  -     Chlorhexidine Skin Prep; Standing  -     Clip Bilateral Groins; Standing  -     Instructions on Coughing, Deep Breathing & Incentive Spirometry; Standing  -     Insert Peripheral IV x2; Standing  -     Saline Lock & Maintain IV Access; Standing  -     Follow Anesthesia Guidelines / Protocol  -     Follow Anesthesia Guidelines / Protocol  -     Obtain Informed Consent  -     Inpatient Admission  -     Chlorhexidine Skin Prep  -     Clip Bilateral Groins  -     Instructions on Coughing, Deep Breathing & Incentive Spirometry  -     Instructions on Coughing, Deep Breathing & Incentive Spirometry  -     Instructions on Coughing, Deep Breathing & Incentive Spirometry  -     Instructions on Coughing, Deep Breathing & Incentive Spirometry  -     Instructions on Coughing, Deep Breathing & Incentive Spirometry  -     Insert Peripheral IV x2  -     Saline Lock & Maintain IV Access  -     Cardiac Monitoring; Standing  -     Cardiac Monitoring  -     Vital Signs - Per Anesthesia Protocol; Standing  -     Oxygen Therapy- Nasal Cannula; Titrate 1-6 LPM Per SpO2; 90 - 95%; Standing  -     Continuous Pulse Oximetry; Standing  -     Insert Peripheral IV;  Standing  -     lactated ringers infusion  -     acetaminophen (TYLENOL) tablet 1,000 mg  -     Midazolam HCl (PF) (VERSED) injection 2 mg  -     Oxygen Therapy- Nasal Cannula; Titrate 1-6 LPM Per SpO2; 90 - 95%  -     Continuous Pulse Oximetry  -     Insert Peripheral IV         Assessment & Plan:   Brenton Rogers is a 69 y.o. male smoker found to have a 6 cm infrarenal abdominal aortic aneurysm as confirmed on CT angiogram of the aorta with bilateral iliofemoral runoff.  He had two-vessel distal runoff to the bilateral lower extremities and no significant stenosis present on review of his CT angiogram.  2.  Past medical history significant for COPD, CHF, CAD, hypertension, dyslipidemia.  3.  Patient is on aspirin, Plavix, and Crestor.  4.  Plan for endovascular aneurysm repair with possible percutaneous approach in the operating room today.  5.  Risks including but not limited to bleeding, infection, need for multiple procedures, limb loss and death as well as benefits and indications regarding endovascular aneurysm repair with possible percutaneous access were discussed with patient and wife who voiced understanding and willingness to proceed.  All questions were answered.  6.  COVID infection within the past month which did delay his original surgical date.  He has had some right hip pain and paresthesias with achiness but this has almost completely resolved with treatment.  7.  Hospitalization over the last weekend at [facility] for bilateral upper extremity weakness and right lower extremity paresthesia and numbness which have resolved and are thought to be secondary to sleep deprivation as well as potentially COVID aftereffects.  We will proceed with endovascular aneurysm repair as his discharge summary and dictated MRI of the head and CT a of the head and neck reports have been reviewed and are within normal limits.        Electronically signed by Aaron Escobedo MD at 10/22/24 3174       Current  Facility-Administered Medications   Medication Dose Route Frequency Provider Last Rate Last Admin   • acetaminophen (TYLENOL) tablet 650 mg  650 mg Oral Q4H PRN Aaron Escobedo MD       • [START ON 10/23/2024] aspirin EC tablet 81 mg  81 mg Oral Daily Aaron Escobedo MD       • ceFAZolin 1000 mg IVPB in 100 mL NS (VTB)  1,000 mg Intravenous Q8H Aaron Escobedo MD       • [START ON 10/23/2024] clopidogrel (PLAVIX) tablet 75 mg  75 mg Oral Daily Aaron Escobedo MD       • [START ON 10/23/2024] heparin (porcine) 5000 UNIT/ML injection 5,000 Units  5,000 Units Subcutaneous Q8H Aaron Escobedo MD       • HYDROcodone-acetaminophen (NORCO)  MG per tablet 1 tablet  1 tablet Oral Q6H PRN Aaron Escobedo MD       • lactated ringers infusion  9 mL/hr Intravenous Continuous PRN Aaron Escobedo MD 9 mL/hr at 10/22/24 0709 Restarted at 10/22/24 0728   • morphine injection 2 mg  2 mg Intravenous Q3H PRN Aaron Escobedo MD        And   • naloxone (NARCAN) injection 0.4 mg  0.4 mg Intravenous Q5 Min PRN Aaron Escobedo MD       • nitroglycerin (NITROSTAT) SL tablet 0.4 mg  0.4 mg Sublingual Q5 Min PRN Aaron Escobedo MD       • [START ON 10/23/2024] rosuvastatin (CRESTOR) tablet 40 mg  40 mg Oral Daily Aaron Escobedo MD       • sacubitril-valsartan (ENTRESTO) 24-26 MG tablet 1 tablet  1 tablet Oral BID Aaron Escobedo MD       • sodium chloride 0.9 % infusion  50 mL/hr Intravenous Continuous Aaron Escobedo MD 50 mL/hr at 10/22/24 1234 50 mL/hr at 10/22/24 1234        Operative/Procedure Notes (last 24 hours)        Aaron Escobedo MD at 10/22/24 0837          ABDOMINAL AORTIC ANEURYSM REPAIR WITH ENDOGRAFT  Procedure Report    Patient Name:  Brenton Rogers  YOB: 1955    Date of Surgery:  10/22/2024     Indications: 69-year-old  male with 6 centimeter asymptomatic infrarenal abdominal aortic aneurysm confirmed on CT angiogram originally found on ultrasound.  Upon review of the CT patient was found  to be a good candidate for an endovascular aneurysm repair which was discussed with him and his wife in clinic and we decided to proceed.    Pre-op Diagnosis:   Infrarenal abdominal aortic aneurysm (AAA) without rupture [I71.43]       Post-Op Diagnosis Codes:     * Infrarenal abdominal aortic aneurysm (AAA) without rupture [I71.43]    Procedure(s):  Percutaneous puncture from bilateral common femoral arteries with sheath placement  Advancement of catheter into aorta  Aortogram with pelvic angiogram  Supervision interpretation of above  Endovascular repair of abdominal aortic aneurysm with bifurcated modular [place] C3 endograft  Percutaneous Pro-glide closure of bilateral common femoral arteries    Staff:  Surgeon(s):  Aaron Escobedo MD    Assistant: Dana Glynn RN CSA    Anesthesia: General    Estimated Blood Loss: 50 mL    Implants:    Implant Name Type Inv. Item Serial No.  Lot No. LRB No. Used Action   STENTGR ENDOPRSTH AAA EXCLUDER I/LAT TRNK 16F [id number] 12CM - [id number] Implant STENTGR ENDOPRSTH AAA EXCLUDER I/LAT TRNK 16F [id number] 12CM  [employer] [id number] Right 1 Implanted   GRFT EXCLDR CONTRALAT [id number] - [id number] Implant GRFT EXCLDR CONTRALAT [id number]  [employer] [id number] Left 1 Implanted   GRFT EXCLDR CONTRALAT 12F 16MM 9.5CM - [id number] Implant GRFT EXCLDR CONTRALAT 12F 16MM 9.5CM  [employer] [id number] Left 1 Implanted       Specimen: N/A       Findings: Uncomplicated repair of 6 cm aneurysm with exclusion endovascularly.  Completion angiogram with patent bilateral renal arteries, aortoiliac endograft and bilateral external and internal iliac arteries without evidence of endoleak.    Complications: None    Description of Procedure: Patient was placed in supine position on the operating table and procedure was performed under general tracheal anesthesia.  A Fields catheter and radial art line were placed for monitoring in the abdomen bilateral groins were prepped  and draped in normal sterile fashion.  Prophylactic IV antibiotics were given prior to skin incision.   The bilateral common femoral arteries were accessed using Seldinger technique with a Glidewire advantage passed into the aorta after micropuncture access had been obtained followed by bilateral 6 Estonian sheath.  Perclose ProGlide sutures were then deployed with 3 on the right and 2 on the left to the femoral arteries intact outside our access sites.  After this time 8 Estonian sheaths were placed to dilate up the tracks.  Subsequently a 16 Estonian sheath was placed on the right and a 12 Estonian sheath was placed on the left and the patient was heparinized with 6000 units of heparin intravenously.   Marker pigtail was then advanced to the level of the renal arteries and an aortogram and pelvic angiogram were obtained.  Single renal arteries were noted with lowest on the left as per the CT angiogram and the SMA was patent.  The aneurysm begins 15 mm below the lowest left renal artery ending at the bifurcation with an ectatic right common iliac artery and some moderate calcified arterial disease throughout.  The length of the aorta from the lowest renal artery to the ipsilateral iliac bifurcation was then measured.  At this point a 26 mm x 14 mm x 12 cm [place] C3 endograft was prepared and advanced through the right sheath into the infrarenal aorta under fluoroscopy with final positioning obtained with a contrast injection under magnification at the level the renals.  The sheath was pulled back and the device was deployed partially at the desired location.   The pigtail catheter was then exchanged over an 035 Glidewire to an angled glide cath and the contralateral gate was able to be cannulated.  The marker pigtail was then reintroduced through the left femoral sheath over an Ampres wire after helicopter maneuver of twirling the pigtail within the endograft was performed confirming true cannulation of the contralateral  gate.  The marked pigtail was then reintroduced over an Amplatz wire and a retrograde left iliac angiogram was performed to criss the common iliac artery bifurcation and confirmed the length of the desired lab.  A 12 x 10 cm contralateral limb was then repaired and the limb was then introduced and deployed at the graft bifurcation in standard fashion.  In similar fashion on the right side the marker pigtail was reintroduced over a wire and a retrograde iliac angiogram was performed marking the right common iliac artery bifurcation and a 16 x 10 cm limb extension was deployed in standard fashion down to the level of the right common iliac artery bifurcation.  A molding occlusive balloon was then used to secure the overlap zones and mold the endograft in the neck and both iliac sealing zones.   A completion angiogram was then obtained showing good position of the endograft with complete exclusion of the aneurysm and no apparent endoleaks.  At this point the arteriotomies were closed in standard fashion using the Perclose ProGlide sutures with serial removal of the sheaths and wires and the closure was hemostatic.  The skin was then closed using a 4-0 Monocryl subcuticular stitch.  The heparin was not reversed initially but the patient did receive 15 units of protamine slowly in the postanesthesia care unit for some oozing around his right IJ central venous line as well as a small soft left groin hematoma.  The patient Toller procedure well and was taken to the postanesthesia care unit in stable condition.    Assistant: Dana Glynn RN CSA  was responsible for performing the following activities: Irrigation, Suturing, Closing, and Placing Dressing and their skilled assistance was necessary for the success of this case.    Aaron Escobedo MD     Date: 10/22/2024  Time: 12:39 EDT      Electronically signed by Aaron Escobedo MD at 10/22/24 7781       Physician Progress Notes (last 24 hours)  Notes from 10/21/24 6295  through 10/22/24 1328   No notes of this type exist for this encounter.       Consult Notes (last 24 hours)  Notes from 10/21/24 1328 through 10/22/24 1328   No notes of this type exist for this encounter.        0

## 2024-10-22 NOTE — PLAN OF CARE
Goal Outcome Evaluation:  Plan of Care Reviewed With: patient        Progress: improving  Outcome Evaluation: Pt arrived from PACU around 1230. Pulse check orders were followed and all pulses are now palpable. Sand bag and ice pack still in place to left femoral access site as ordered. BERNIE femoral sites without any more swelling or bruising. Pt now sitting up and tolerating PO intake. Pt remains on RA and family has been in and out checking on patient. Pt denies pain, just states he has pressure to his femoral sites. Overall doing well.

## 2024-10-22 NOTE — OP NOTE
ABDOMINAL AORTIC ANEURYSM REPAIR WITH ENDOGRAFT  Procedure Report    Patient Name:  Brenton Rogers  YOB: 1955    Date of Surgery:  10/22/2024     Indications: 69-year-old  male with 6 centimeter asymptomatic infrarenal abdominal aortic aneurysm confirmed on CT angiogram originally found on ultrasound.  Upon review of the CT patient was found to be a good candidate for an endovascular aneurysm repair which was discussed with him and his wife in clinic and we decided to proceed.    Pre-op Diagnosis:   Infrarenal abdominal aortic aneurysm (AAA) without rupture [I71.43]       Post-Op Diagnosis Codes:     * Infrarenal abdominal aortic aneurysm (AAA) without rupture [I71.43]    Procedure(s):  Percutaneous puncture from bilateral common femoral arteries with sheath placement  Advancement of catheter into aorta  Aortogram with pelvic angiogram  Supervision interpretation of above  Endovascular repair of abdominal aortic aneurysm with bifurcated modular [place] C3 endograft  Percutaneous Pro-glide closure of bilateral common femoral arteries    Staff:  Surgeon(s):  Aaron Escobedo MD    Assistant: Dana Glynn RN CSA    Anesthesia: General    Estimated Blood Loss: 50 mL    Implants:    Implant Name Type Inv. Item Serial No.  Lot No. LRB No. Used Action   STENTGR ENDOPRSTH AAA EXCLUDER I/LAT TRNK 16F [id number] 12CM - [id number] Implant STENTGR ENDOPRSTH AAA EXCLUDER I/LAT TRNK 16F 14.5X26MM 12CM  [employer] [id number] Right 1 Implanted   GRFT EXCLDR CONTRALAT [id number] - [id number] Implant GRFT EXCLDR CONTRALAT [id number]  [employer] [id number] Left 1 Implanted   GRFT EXCLDR CONTRALAT 12F 16MM 9.5CM - [id number] Implant GRFT EXCLDR CONTRALAT 12F 16MM 9.5CM  [employer] [id number] Left 1 Implanted       Specimen: N/A       Findings: Uncomplicated repair of 6 cm aneurysm with exclusion endovascularly.  Completion angiogram with patent bilateral renal arteries, aortoiliac  endograft and bilateral external and internal iliac arteries without evidence of endoleak.    Complications: None    Description of Procedure: Patient was placed in supine position on the operating table and procedure was performed under general tracheal anesthesia.  A Fields catheter and radial art line were placed for monitoring in the abdomen bilateral groins were prepped and draped in normal sterile fashion.  Prophylactic IV antibiotics were given prior to skin incision.   The bilateral common femoral arteries were accessed using Seldinger technique with a Glidewire advantage passed into the aorta after micropuncture access had been obtained followed by bilateral 6 Brazilian sheath.  Perclose ProGlide sutures were then deployed with 3 on the right and 2 on the left to the femoral arteries intact outside our access sites.  After this time 8 Brazilian sheaths were placed to dilate up the tracks.  Subsequently a 16 Brazilian sheath was placed on the right and a 12 Brazilian sheath was placed on the left and the patient was heparinized with 6000 units of heparin intravenously.   Marker pigtail was then advanced to the level of the renal arteries and an aortogram and pelvic angiogram were obtained.  Single renal arteries were noted with lowest on the left as per the CT angiogram and the SMA was patent.  The aneurysm begins 15 mm below the lowest left renal artery ending at the bifurcation with an ectatic right common iliac artery and some moderate calcified arterial disease throughout.  The length of the aorta from the lowest renal artery to the ipsilateral iliac bifurcation was then measured.  At this point a 26 mm x 14 mm x 12 cm [place] C3 endograft was prepared and advanced through the right sheath into the infrarenal aorta under fluoroscopy with final positioning obtained with a contrast injection under magnification at the level the renals.  The sheath was pulled back and the device was deployed partially at the desired  location.   The pigtail catheter was then exchanged over an 035 Glidewire to an angled glide cath and the contralateral gate was able to be cannulated.  The marker pigtail was then reintroduced through the left femoral sheath over an Ampres wire after helicopter maneuver of twirling the pigtail within the endograft was performed confirming true cannulation of the contralateral gate.  The marked pigtail was then reintroduced over an Amplatz wire and a retrograde left iliac angiogram was performed to criss the common iliac artery bifurcation and confirmed the length of the desired lab.  A 12 x 10 cm contralateral limb was then repaired and the limb was then introduced and deployed at the graft bifurcation in standard fashion.  In similar fashion on the right side the marker pigtail was reintroduced over a wire and a retrograde iliac angiogram was performed marking the right common iliac artery bifurcation and a 16 x 10 cm limb extension was deployed in standard fashion down to the level of the right common iliac artery bifurcation.  A molding occlusive balloon was then used to secure the overlap zones and mold the endograft in the neck and both iliac sealing zones.   A completion angiogram was then obtained showing good position of the endograft with complete exclusion of the aneurysm and no apparent endoleaks.  At this point the arteriotomies were closed in standard fashion using the Perclose ProGlide sutures with serial removal of the sheaths and wires and the closure was hemostatic.  The skin was then closed using a 4-0 Monocryl subcuticular stitch.  The heparin was not reversed initially but the patient did receive 15 units of protamine slowly in the postanesthesia care unit for some oozing around his right IJ central venous line as well as a small soft left groin hematoma.  The patient Toller procedure well and was taken to the postanesthesia care unit in stable condition.    Assistant: Dana Glynn RN CSA  was  responsible for performing the following activities: Irrigation, Suturing, Closing, and Placing Dressing and their skilled assistance was necessary for the success of this case.    Aaron Escobedo MD     Date: 10/22/2024  Time: 12:39 EDT

## 2024-10-22 NOTE — CONSULTS
Pulmonary / Critical Care Consult Note      Patient Name: Brenton Rogers  : 1955  MRN: [id number]  Primary Care Physician:  Louis Lea DO  Referring Physician: Aaron Escobedo MD  Date of admission: 10/22/2024    Subjective   Subjective     Reason for Consult/ Chief Complaint:   Infrarenal AAA aortic aneurysm    HPI:    Patient is a 69-year-old male with medical history significant for hypertension, hyperlipidemia, COPD, CHF, COVID 19, prostate cancer, CAD status post PCI, former smoker who was  found to have a 5.4 cm infrarenal abdominal aortic aneurysm on surveillance ultrasound at [facility].  Patient had CTA abdomen and pelvis ultimately showing a 6 cm aneurysm with a 1.5 cm neck and lowest renal on the left.  Patient also had a CT angiogram of the head as well as an MRI which showed no evidence of any stroke or bleed.  He came here for an elective infrarenal abdominal aortic aneurysm repair which she had today.  Status postprocedure which was uneventful, patient has been admitted into the intensive care unit.  Patient seen and examined at bedside, all the labs and diagnostic imaging studies were extensively reviewed by me and findings as described.  Labs and imaging studies are largely unremarkable    Review of systems:  12 point review of system including pertinent positives and negatives as in HPI    Personal History     Past Medical History:   Diagnosis Date    Aneurysm     AAA- 6 CM- SEE'S DR ESCOBEDO    Broken bones     Cancer     PROSTATE CANCER    Hyperlipidemia     Hypertension     Leg pain     Myocardial infarction 2023    1 STENT- [facility] DR FLORES [facility]       Past Surgical History:   Procedure Laterality Date    CARDIAC CATHETERIZATION  2023    1 STENT PLACED    PROSTATECTOMY      5 YEARS AGO       Family History: family history is not on file. Otherwise pertinent FHx was reviewed and not pertinent to current issue.    Social History:  reports that he quit  "smoking about 52 years ago. His smoking use included cigarettes. He started smoking about 15 months ago. He has never used smokeless tobacco. He reports current alcohol use. He reports that he does not currently use drugs after having used the following drugs: Marijuana, LSD, Amphetamines, \"Crack\" cocaine, and Methamphetamines.    Home Medications:  HYDROcodone-acetaminophen, aspirin, clopidogrel, empagliflozin, multivitamin with minerals, rosuvastatin, sacubitril-valsartan, and vitamin B-12    Allergies:  No Known Allergies    Objective    Objective     Vitals:   Temp:  [97 °F (36.1 °C)-97.6 °F (36.4 °C)] 97.6 °F (36.4 °C)  Heart Rate:  [64-84] 67  Resp:  [14-20] 16  BP: (106-152)/() 136/77  Flow (L/min) (Oxygen Therapy):  [6] 6    Physical Exam:  Vital Signs Reviewed     General: An elderly  male in no distress, awake alert and oriented    HEENT: Pupils equal and reactive to light     Respiratory: Air entry is diminished bilaterally    Cardiovascular: First and Second heart sounds heard     Neurological: Awake, alert and fully oriented. No focal neurologic deficit     Gastrointestinal: Abdomen is soft, nondistended. No palpable organomegally    Extremities: No pedal edema     Skin: Intact      Result Review    Result Review:  I have personally reviewed the results from the time of this admission to 10/22/2024 15:17 EDT and agree with these findings:  [x]  Laboratory  []  Microbiology  [x]  Radiology  []  EKG/Telemetry   []  Cardiology/Vascular   []  Pathology  []  Old records  []  Other:  Most notable findings include: Reviewed    Assessment & Plan   Assessment / Plan     Active Hospital Problems:  Active Hospital Problems    Diagnosis     **AAA (abdominal aortic aneurysm) without rupture      Infrarenal abdominal aortic aneurysm  CAD status post stent  Hypertension  COPD  History of tobacco use    Plan:  Patient on aspirin 81 mg p.o. daily  Plavix 75 mg p.o. daily  Cefazolin 1 g IV piggyback every " 8 hours  Crestor 40 mg p.o. daily  Entresto 1 tab p.o. twice daily  Diet: Cardiac diet when okay by cardiology    DVT prophylaxis:Heparin 5000 units SQ daily      The patient is critically ill in the ICU. Multidisciplinary bedside critical care rounds were performed with nursing staff, respiratory therapy, pharmacy, nutritional services, social work. I have personally reviewed the chart, labs and any pertinent imaging available.  I have spent 45 minutes of critical care time, excluding procedures, in the care of this patient.

## 2024-10-23 LAB
ALBUMIN SERPL-MCNC: 3.8 G/DL (ref 3.5–5.2)
ALBUMIN/GLOB SERPL: 1.7 G/DL
ALP SERPL-CCNC: 53 U/L (ref 39–117)
ALT SERPL W P-5'-P-CCNC: 22 U/L (ref 1–41)
ANION GAP SERPL CALCULATED.3IONS-SCNC: 12.6 MMOL/L (ref 5–15)
APTT PPP: 25.6 SECONDS (ref 24.2–34.2)
AST SERPL-CCNC: 15 U/L (ref 1–40)
BASOPHILS # BLD AUTO: 0.02 10*3/MM3 (ref 0–0.2)
BASOPHILS NFR BLD AUTO: 0.2 % (ref 0–1.5)
BILIRUB SERPL-MCNC: 0.4 MG/DL (ref 0–1.2)
BUN SERPL-MCNC: 12 MG/DL (ref 8–23)
BUN/CREAT SERPL: 14.8 (ref 7–25)
CALCIUM SPEC-SCNC: 8.7 MG/DL (ref 8.6–10.5)
CHLORIDE SERPL-SCNC: 102 MMOL/L (ref 98–107)
CO2 SERPL-SCNC: 21.4 MMOL/L (ref 22–29)
CREAT SERPL-MCNC: 0.81 MG/DL (ref 0.76–1.27)
DEPRECATED RDW RBC AUTO: 45.4 FL (ref 37–54)
EGFRCR SERPLBLD CKD-EPI 2021: 94.3 ML/MIN/1.73
EOSINOPHIL # BLD AUTO: 0.04 10*3/MM3 (ref 0–0.4)
EOSINOPHIL NFR BLD AUTO: 0.3 % (ref 0.3–6.2)
ERYTHROCYTE [DISTWIDTH] IN BLOOD BY AUTOMATED COUNT: 13.3 % (ref 12.3–15.4)
GLOBULIN UR ELPH-MCNC: 2.3 GM/DL
GLUCOSE SERPL-MCNC: 88 MG/DL (ref 65–99)
HCT VFR BLD AUTO: 33.3 % (ref 37.5–51)
HGB BLD-MCNC: 11.2 G/DL (ref 13–17.7)
IMM GRANULOCYTES # BLD AUTO: 0.07 10*3/MM3 (ref 0–0.05)
IMM GRANULOCYTES NFR BLD AUTO: 0.5 % (ref 0–0.5)
INR PPP: 1.14 (ref 0.86–1.15)
LYMPHOCYTES # BLD AUTO: 3.66 10*3/MM3 (ref 0.7–3.1)
LYMPHOCYTES NFR BLD AUTO: 28.2 % (ref 19.6–45.3)
MAGNESIUM SERPL-MCNC: 2.1 MG/DL (ref 1.6–2.4)
MCH RBC QN AUTO: 31.5 PG (ref 26.6–33)
MCHC RBC AUTO-ENTMCNC: 33.6 G/DL (ref 31.5–35.7)
MCV RBC AUTO: 93.5 FL (ref 79–97)
MONOCYTES # BLD AUTO: 0.77 10*3/MM3 (ref 0.1–0.9)
MONOCYTES NFR BLD AUTO: 5.9 % (ref 5–12)
NEUTROPHILS NFR BLD AUTO: 64.9 % (ref 42.7–76)
NEUTROPHILS NFR BLD AUTO: 8.43 10*3/MM3 (ref 1.7–7)
NRBC BLD AUTO-RTO: 0 /100 WBC (ref 0–0.2)
PHOSPHATE SERPL-MCNC: 2.6 MG/DL (ref 2.5–4.5)
PLATELET # BLD AUTO: 145 10*3/MM3 (ref 140–450)
PMV BLD AUTO: 10.1 FL (ref 6–12)
POTASSIUM SERPL-SCNC: 3.9 MMOL/L (ref 3.5–5.2)
PROT SERPL-MCNC: 6.1 G/DL (ref 6–8.5)
PROTHROMBIN TIME: 14.8 SECONDS (ref 11.8–14.9)
RBC # BLD AUTO: 3.56 10*6/MM3 (ref 4.14–5.8)
SODIUM SERPL-SCNC: 136 MMOL/L (ref 136–145)
WBC NRBC COR # BLD AUTO: 12.99 10*3/MM3 (ref 3.4–10.8)

## 2024-10-23 PROCEDURE — 84100 ASSAY OF PHOSPHORUS: CPT | Performed by: PHYSICIAN ASSISTANT

## 2024-10-23 PROCEDURE — 85730 THROMBOPLASTIN TIME PARTIAL: CPT | Performed by: SURGERY

## 2024-10-23 PROCEDURE — 25010000002 CEFAZOLIN PER 500 MG: Performed by: SURGERY

## 2024-10-23 PROCEDURE — 25010000002 HEPARIN (PORCINE) PER 1000 UNITS: Performed by: SURGERY

## 2024-10-23 PROCEDURE — 85610 PROTHROMBIN TIME: CPT | Performed by: SURGERY

## 2024-10-23 PROCEDURE — 83735 ASSAY OF MAGNESIUM: CPT | Performed by: PHYSICIAN ASSISTANT

## 2024-10-23 PROCEDURE — 85025 COMPLETE CBC W/AUTO DIFF WBC: CPT | Performed by: SURGERY

## 2024-10-23 PROCEDURE — 80053 COMPREHEN METABOLIC PANEL: CPT | Performed by: PHYSICIAN ASSISTANT

## 2024-10-23 RX ORDER — AMOXICILLIN 250 MG
2 CAPSULE ORAL 2 TIMES DAILY
Status: DISCONTINUED | OUTPATIENT
Start: 2024-10-23 | End: 2024-10-24 | Stop reason: HOSPADM

## 2024-10-23 RX ORDER — BISACODYL 10 MG
10 SUPPOSITORY, RECTAL RECTAL DAILY PRN
Status: DISCONTINUED | OUTPATIENT
Start: 2024-10-23 | End: 2024-10-24 | Stop reason: HOSPADM

## 2024-10-23 RX ORDER — HEPARIN SODIUM 5000 [USP'U]/ML
5000 INJECTION, SOLUTION INTRAVENOUS; SUBCUTANEOUS EVERY 12 HOURS SCHEDULED
Status: DISCONTINUED | OUTPATIENT
Start: 2024-10-23 | End: 2024-10-24 | Stop reason: HOSPADM

## 2024-10-23 RX ORDER — POLYETHYLENE GLYCOL 3350 17 G/17G
17 POWDER, FOR SOLUTION ORAL DAILY PRN
Status: DISCONTINUED | OUTPATIENT
Start: 2024-10-23 | End: 2024-10-24 | Stop reason: HOSPADM

## 2024-10-23 RX ORDER — BISACODYL 5 MG/1
5 TABLET, DELAYED RELEASE ORAL DAILY PRN
Status: DISCONTINUED | OUTPATIENT
Start: 2024-10-23 | End: 2024-10-24 | Stop reason: HOSPADM

## 2024-10-23 RX ADMIN — SACUBITRIL AND VALSARTAN 1 TABLET: 24; 26 TABLET, FILM COATED ORAL at 20:33

## 2024-10-23 RX ADMIN — SENNOSIDES AND DOCUSATE SODIUM 2 TABLET: 50; 8.6 TABLET ORAL at 20:33

## 2024-10-23 RX ADMIN — HEPARIN SODIUM 5000 UNITS: 5000 INJECTION INTRAVENOUS; SUBCUTANEOUS at 20:34

## 2024-10-23 RX ADMIN — CEFAZOLIN 1000 MG: 1 INJECTION, POWDER, FOR SOLUTION INTRAMUSCULAR; INTRAVENOUS; PARENTERAL at 00:29

## 2024-10-23 RX ADMIN — SACUBITRIL AND VALSARTAN 1 TABLET: 24; 26 TABLET, FILM COATED ORAL at 08:17

## 2024-10-23 RX ADMIN — HYDROCODONE BITARTRATE AND ACETAMINOPHEN 1 TABLET: 10; 325 TABLET ORAL at 03:37

## 2024-10-23 RX ADMIN — ROSUVASTATIN CALCIUM 40 MG: 20 TABLET, FILM COATED ORAL at 08:17

## 2024-10-23 RX ADMIN — CLOPIDOGREL BISULFATE 75 MG: 75 TABLET ORAL at 08:17

## 2024-10-23 RX ADMIN — CEFAZOLIN 1000 MG: 1 INJECTION, POWDER, FOR SOLUTION INTRAMUSCULAR; INTRAVENOUS; PARENTERAL at 08:17

## 2024-10-23 RX ADMIN — HYDROCODONE BITARTRATE AND ACETAMINOPHEN 1 TABLET: 10; 325 TABLET ORAL at 10:01

## 2024-10-23 RX ADMIN — HYDROCODONE BITARTRATE AND ACETAMINOPHEN 1 TABLET: 10; 325 TABLET ORAL at 20:34

## 2024-10-23 RX ADMIN — ASPIRIN 81 MG: 81 TABLET, COATED ORAL at 08:17

## 2024-10-23 RX ADMIN — HEPARIN SODIUM 5000 UNITS: 5000 INJECTION INTRAVENOUS; SUBCUTANEOUS at 08:17

## 2024-10-23 NOTE — PROGRESS NOTES
[facility]     Progress Note    Patient Name: Brenton Rogers  : 1955  MRN: [id number]  Primary Care Physician:  Louis Lea DO  Date of admission: 10/22/2024    Subjective   Subjective   Pt seen and doing fine this AM.  L groin hematoma resolved.  Tolerating PO intake well.  Some bruising of lips.    Objective   Objective     Vitals:   Temp:  [97 °F (36.1 °C)-98.8 °F (37.1 °C)] 97.9 °F (36.6 °C)  Heart Rate:  [63-87] 64  Resp:  [14-18] 18  BP: (106-158)/() 135/70  Flow (L/min) (Oxygen Therapy):  [6] 6    Physical Exam   Constitutional: Awake, alert   Neck: Supple   Respiratory: Clear to auscultation bilaterally, nonlabored respirations    Cardiovascular: RRR, no murmurs   Abdomen: benign, with groin access sites C/D/I without hematoma   Extremities: symmetric   Pulses: B radial and PT pulses palpable    Assessment & Plan   Assessment / Plan     Assessment/Plan:    69-year-old  male POD #1 status post percutaneous EVAR.  Doing well.  2.  Will DC radial art line as well as Fields catheter this morning and patient can be up to chair for meals.  3.  Patient can ambulate in halls and increase activity.  4.  Resume home medications and possible  discharge to home later today with continued improvement.    Active Hospital Problems:  Active Hospital Problems    Diagnosis     **AAA (abdominal aortic aneurysm) without rupture            Electronically signed by Aaron Escobedo MD, 10/23/24, 8:21 AM EDT.

## 2024-10-23 NOTE — PLAN OF CARE
Goal Outcome Evaluation:  Plan of Care Reviewed With: patient        Progress: improving          Pt assessment per flowsheet. Medications per MAR. VSS. All BLE pulses deplorable. See flowsheet. No S/S of bleeding at bilateral groin sites. L groin site has Ice pack and sand bag per orders. No hematomas present. Labs reviewed. Report to Lis FRASER.

## 2024-10-24 ENCOUNTER — READMISSION MANAGEMENT (OUTPATIENT)
Dept: CALL CENTER | Facility: HOSPITAL | Age: 69
End: 2024-10-24
Payer: MEDICARE

## 2024-10-24 VITALS
SYSTOLIC BLOOD PRESSURE: 134 MMHG | OXYGEN SATURATION: 98 % | RESPIRATION RATE: 16 BRPM | TEMPERATURE: 97.3 F | HEIGHT: 69 IN | BODY MASS INDEX: 23.22 KG/M2 | WEIGHT: 156.75 LBS | DIASTOLIC BLOOD PRESSURE: 88 MMHG | HEART RATE: 79 BPM

## 2024-10-24 PROCEDURE — 25010000002 HEPARIN (PORCINE) PER 1000 UNITS: Performed by: SURGERY

## 2024-10-24 PROCEDURE — 99024 POSTOP FOLLOW-UP VISIT: CPT | Performed by: SURGERY

## 2024-10-24 PROCEDURE — 97161 PT EVAL LOW COMPLEX 20 MIN: CPT

## 2024-10-24 RX ADMIN — ASPIRIN 81 MG: 81 TABLET, COATED ORAL at 08:59

## 2024-10-24 RX ADMIN — SACUBITRIL AND VALSARTAN 1 TABLET: 24; 26 TABLET, FILM COATED ORAL at 08:59

## 2024-10-24 RX ADMIN — CLOPIDOGREL BISULFATE 75 MG: 75 TABLET ORAL at 08:59

## 2024-10-24 RX ADMIN — SENNOSIDES AND DOCUSATE SODIUM 2 TABLET: 50; 8.6 TABLET ORAL at 08:59

## 2024-10-24 RX ADMIN — ROSUVASTATIN CALCIUM 40 MG: 20 TABLET, FILM COATED ORAL at 09:00

## 2024-10-24 RX ADMIN — POLYETHYLENE GLYCOL 3350 17 G: 17 POWDER, FOR SOLUTION ORAL at 03:42

## 2024-10-24 RX ADMIN — HEPARIN SODIUM 5000 UNITS: 5000 INJECTION INTRAVENOUS; SUBCUTANEOUS at 08:59

## 2024-10-24 NOTE — PLAN OF CARE
Goal Outcome Evaluation:           Progress: improving  Outcome Evaluation: pt dc home.

## 2024-10-24 NOTE — PLAN OF CARE
Goal Outcome Evaluation:              Outcome Evaluation: VSS, medicated for pain, see MAR, Ice to bilateral groin incisions. patient amb in couch without assist.

## 2024-10-24 NOTE — DISCHARGE INSTRUCTIONS
No pushing pulling greater than 10-15 lbs until cleared.  May shower and gently wash over access and dab dry.  Place ice pack to bilateral groin access as needed for swelling or discomfort.  Call for any issues of severe pain, swelling or numbness to extremity.  Call vascular surgery office Monday for follow up appointment if not already arranged.  May DC patient to home this afternoon.  Ambulate at home and increase activity as able.  May call the office for any questions or concerns.

## 2024-10-24 NOTE — SIGNIFICANT NOTE
10/24/24 1300   Coping/Psychosocial   Observed Emotional State calm;cooperative   Verbalized Emotional State hopefulness   Trust Relationship/Rapport empathic listening provided   Involvement in Care interacting with patient   Additional Documentation Spiritual Care (Group)   Spiritual Care   Use of Spiritual Resources non-Judaism use of spiritual care   Spiritual Care Source  initiative   Spiritual Care Follow-Up follow-up, none required as presently assessed   Response to Spiritual Care receptive of support   Spiritual Care Interventions supportive conversation provided   Spiritual Care Visit Type initial   Receptivity to Spiritual Care visit welcomed

## 2024-10-24 NOTE — THERAPY EVALUATION
Acute Care - Physical Therapy Initial Evaluation and Discharge   [facility]     Patient Name: Brenton Rogers  : 1955  MRN: [id number]  Today's Date: 10/24/2024      Visit Dx:     ICD-10-CM ICD-9-CM   1. Difficulty in walking  R26.2 719.7   2. Infrarenal abdominal aortic aneurysm (AAA) without rupture  I71.43 441.4     Patient Active Problem List   Diagnosis    AAA (abdominal aortic aneurysm) without rupture     Past Medical History:   Diagnosis Date    Aneurysm     AAA- 6 CM- SEE'S DR ESCOBEDO    Broken bones     Cancer     PROSTATE CANCER    Hyperlipidemia     Hypertension     Leg pain     Myocardial infarction 2023    1 STENT- SEE'S DR FLORES IN [facility]     Past Surgical History:   Procedure Laterality Date    ABDOMINAL AORTIC ANEURYSM REPAIR WITH ENDOGRAFT N/A 10/22/2024    Procedure: ABDOMINAL AORTIC ANEURYSM REPAIR WITH ENDOGRAFT, POSSIBLE PERCUTANEOUS ACCESS;  Surgeon: Aaron Escobedo MD;  Location: [facility];  Service: Vascular;  Laterality: N/A;    CARDIAC CATHETERIZATION  2023    1 STENT PLACED    PROSTATECTOMY      5 YEARS AGO     PT Assessment (Last 12 Hours)       PT Evaluation and Treatment       Row Name 10/24/24 1201          Physical Therapy Time and Intention    Subjective Information complains of;fatigue;weakness;pain (P)   -KL     Document Type evaluation (P)   -KL     Mode of Treatment individual therapy;physical therapy (P)   -KL     Total Minutes, Physical Therapy 30 (P)   -KL     Patient Effort good (P)   -KL     Symptoms Noted During/After Treatment none (P)   -KL       Row Name 10/24/24 1201          General Information    Patient Profile Reviewed yes (P)   -KL     Patient Observations alert;cooperative;agree to therapy (P)   -KL     Prior Level of Function independent: (P)   -KL     Equipment Currently Used at Home none (P)   -KL     Existing Precautions/Restrictions no known precautions/restrictions (P)   -KL     Risks Reviewed patient: (P)   -KL     Benefits Reviewed  patient: (P)   -KL     Barriers to Rehab none identified (P)   -       Row Name 10/24/24 1201          Previous Level of Function/Home Environm    Bathing, Previous Functional Level independent (P)   -KL     Grooming, Previous Functional Level independent (P)   -KL     Dressing, Previous Functional Level independent (P)   -KL     Eating/Feeding, Previous Functional Level independent (P)   -KL     Toileting, Previous Functional Level independent (P)   -KL     BADLs, Previous Functional Level independent (P)   -KL     IADLs, Previous Functional Level independent (P)   -KL     Bed Mobility, Previous Functional Level independent (P)   -KL     Transfers, Previous Functional Level independent (P)   -KL     Household Ambulation, Previous Functional Level independent (P)   -KL     Stairs, Previous Functional Level independent (P)   -KL     Community Ambulation, Previous Functional Level independent (P)   -KL       Row Name 10/24/24 1201          Living Environment    Current Living Arrangements home (P)   -KL     People in Home spouse (P)   -KL     Primary Care Provided by self (P)   -       Row Name 10/24/24 1201          Home Use of Assistive/Adaptive Equipment    Equipment Currently Used at Home none (P)   -       Row Name 10/24/24 1201          Range of Motion (ROM)    Range of Motion ROM is WNL (P)   -       Row Name 10/24/24 1201          Strength (Manual Muscle Testing)    Strength (Manual Muscle Testing) other (see comments) (P)   Pt scored 5/5 for bilateral hip flex, knee flex, knee ext, and ankle DF MMTs  -       Row Name 10/24/24 1201          Bed Mobility    Bed Mobility bed mobility (all) activities (P)   -KL     All Activities, Lawrence (Bed Mobility) contact guard (P)   -       Row Name 10/24/24 1201          Transfers    Transfers sit-stand transfer;stand-sit transfer (P)   -     Maintains Weight-bearing Status (Transfers) able to maintain (P)   -       Row Name 10/24/24 1201           Sit-Stand Transfer    Sit-Stand Foard (Transfers) standby assist (P)   -KL       Row Name 10/24/24 1201          Stand-Sit Transfer    Stand-Sit Foard (Transfers) standby assist (P)   -KL       Row Name 10/24/24 1201          Gait/Stairs (Locomotion)    Gait/Stairs Locomotion gait/ambulation independence (P)   -KL     Foard Level (Gait) standby assist (P)   -     Patient was able to Ambulate yes (P)   -KL     Distance in Feet (Gait) 250 (P)   -KL     Pattern (Gait) 2-point (P)   -KL       Row Name 10/24/24 1201          Safety Issues/Impairments Affecting Functional Mobility    Impairments Affecting Function (Mobility) balance;coordination;motor control;pain;range of motion (ROM);shortness of breath;strength (P)   -KL       Row Name 10/24/24 1201          Balance    Balance Assessment standing dynamic balance (P)   -KL     Dynamic Standing Balance standby assist (P)   -     Balance Interventions standing (P)   -       Row Name             Wound 10/22/24 0837 Bilateral anterior groin    Wound - Properties Group Placement Date: 10/22/24  -KK Placement Time: 0837  -KK Side: Bilateral  -KK Orientation: anterior  -KK Location: groin  -KK Primary Wound Type: Incision  -KK Present on Original Admission: N  -KK    Retired Wound - Properties Group Placement Date: 10/22/24  -KK Placement Time: 0837  -KK Present on Original Admission: N  -KK Side: Bilateral  -KK Orientation: anterior  -KK Location: groin  -KK Primary Wound Type: Incision  -KK    Retired Wound - Properties Group Placement Date: 10/22/24  -KK Placement Time: 0837  -KK Present on Original Admission: N  -KK Side: Bilateral  -KK Orientation: anterior  -KK Location: groin  -KK Primary Wound Type: Incision  -KK    Retired Wound - Properties Group Date first assessed: 10/22/24  -KK Time first assessed: 0837  -KK Present on Original Admission: N  -KK Side: Bilateral  -KK Location: groin  -KK Primary Wound Type: Incision  -KK      Row Name  10/24/24 1201          Plan of Care Review    Plan of Care Reviewed With patient (P)   -     Progress improving (P)   -     Outcome Evaluation Pt presents to treatment with complaints of fatigue and weakness in BLE. Was able to ambulate with minimal difficulty and slight complaints of fatigue. Recommend discharge to home at this time. (P)   -       Row Name 10/24/24 1201          Positioning and Restraints    Pre-Treatment Position in bed (P)   -     Post Treatment Position bed (P)   -KL     In Bed supine (P)   -       Row Name 10/24/24 1201          Therapy Assessment/Plan (PT)    Rehab Potential (PT) good (P)   -     Criteria for Skilled Interventions Met (PT) no (P)   -     Therapy Frequency (PT) evaluation only (P)   -     Problem List (PT) problems related to;balance;cognition;coordination;mobility;range of motion (ROM);strength;pain;postural control (P)   -       Row Name 10/24/24 1201          Therapy Plan Review/Discharge Plan (PT)    Therapy Plan Review (PT) evaluation/treatment results reviewed (P)   -       Row Name 10/24/24 1201          Physical Therapy Goals    Problem Specific Goal Selection (PT) problem specific goal 1, PT (P)   -       Row Name 10/24/24 1201          Problem Specific Goal 1 (PT)    Problem Specific Goal 1 (PT) Ambulate without pain (P)   -     Time Frame (Problem Specific Goal 1, PT) by discharge (P)   -     Progress/Outcome (Problem Specific Goal 1, PT) goal met (P)   -               User Key  (r) = Recorded By, (t) = Taken By, (c) = Cosigned By      Initials Name Provider Type    Ashley Blanco, RN Registered Nurse    Blanca Gaitan, PT Student PT Student                    Physical Therapy Education       Title: PT OT SLP Therapies (Done)       Topic: Physical Therapy (Done)       Point: Mobility training (Done)       Learning Progress Summary            Patient Acceptance, E,TB, VU by YOLANDA at 10/24/2024 1204                      Point: Home  exercise program (Done)       Learning Progress Summary            Patient Acceptance, E,TB, VU by  at 10/24/2024 1204                      Point: Body mechanics (Done)       Learning Progress Summary            Patient Acceptance, E,TB, VU by  at 10/24/2024 1204                      Point: Precautions (Done)       Learning Progress Summary            Patient Acceptance, E,TB, VU by  at 10/24/2024 1204                                      User Key       Initials Effective Dates Name Provider Type Discipline     08/27/24 -  Blanca Hendricks, PT Student PT Student PT                  PT Recommendation and Plan  Anticipated Discharge Disposition (PT): (P) home  Therapy Frequency (PT): (P) evaluation only  Plan of Care Reviewed With: (P) patient  Progress: (P) improving  Outcome Evaluation: (P) Pt presents to treatment with complaints of fatigue and weakness in BLE. Was able to ambulate with minimal difficulty and slight complaints of fatigue. Recommend discharge to home at this time.   Outcome Measures       Row Name 10/24/24 1200             How much help from another person do you currently need...    Turning from your back to your side while in flat bed without using bedrails? 4 (P)   -KL      Moving from lying on back to sitting on the side of a flat bed without bedrails? 4 (P)   -KL      Moving to and from a bed to a chair (including a wheelchair)? 4 (P)   -KL      Standing up from a chair using your arms (e.g., wheelchair, bedside chair)? 4 (P)   -KL      Climbing 3-5 steps with a railing? 4 (P)   -KL      To walk in hospital room? 4 (P)   -      AM-PAC 6 Clicks Score (PT) 24 (P)   -         Functional Assessment    Outcome Measure Options AM-PAC 6 Clicks Basic Mobility (PT) (P)   -                User Key  (r) = Recorded By, (t) = Taken By, (c) = Cosigned By      Initials Name Provider Type    Blanca Gaitan, PT Student PT Student                     Time Calculation:    PT Charges       Row Name  10/24/24 1201             Time Calculation    PT Received On 10/24/24 (P)   -KL         Time Calculation- PT    Total Timed Code Minutes- PT 30 minute(s) (P)   -KL         Untimed Charges    PT Eval/Re-eval Minutes 30 (P)   -KL         Total Minutes    Untimed Charges Total Minutes 30 (P)   -KL       Total Minutes 30 (P)   -KL                User Key  (r) = Recorded By, (t) = Taken By, (c) = Cosigned By      Initials Name Provider Type    Blanca Gaitan PT Student PT Student                  Therapy Charges for Today       Code Description Service Date Service Provider Modifiers Qty    [id number] HC PT EVAL LOW COMPLEXITY 2 10/24/2024 Blanca Hendricks PT Student GP 1            PT G-Codes  Outcome Measure Options: (P) AM-PAC 6 Clicks Basic Mobility (PT)  AM-PAC 6 Clicks Score (PT): (P) 24    Blanca Hendricks PT Student  10/24/2024

## 2024-10-24 NOTE — DISCHARGE SUMMARY
[facility]         DISCHARGE SUMMARY    Patient Name: Brenton Rogers  : 1955  MRN: [id number]  [place] until morning come back to place  Date of Admission: 10/22/2024  Date of Discharge:  10/24/2024  Primary Care Physician: Louis Lea DO    Consults       No orders found from 2024 to 10/23/2024.            Presenting Problem:   Infrarenal abdominal aortic aneurysm (AAA) without rupture [I71.43]  AAA (abdominal aortic aneurysm) without rupture [I71.40]    Active and Resolved Hospital Problems:  Active Hospital Problems    Diagnosis POA    **AAA (abdominal aortic aneurysm) without rupture [I71.40] Unknown      Resolved Hospital Problems   No resolved problems to display.         Hospital Course     Hospital Course:  Brenton Rogers is a 69 y.o. male who was found to have an infrarenal abdominal aortic aneurysm on ultrasound and ultimately confirmed with CT angiogram of the abdomen pelvis to be 6 cm with a 1.5 cm neck.  He underwent percutaneous endovascular aneurysm repair on 10/22/2024 at that any issue or complication.  Tolerated the procedure well and convalesced in the hospital over the past 2 nights while being monitored for his recovery and for adequate pain control.  He is now ambulating in halls well, tolerating p.o. intake once again with pain controlled and no new issues and is ready to be discharged home.      Day of Discharge     Vital Signs:  Temp:  [97.3 °F (36.3 °C)-98.6 °F (37 °C)] 97.3 °F (36.3 °C)  Heart Rate:  [70-95] 79  Resp:  [16-21] 16  BP: (118-151)/(62-95) 134/88    Physical Exam:  Constitutional: Awake, alert   Neck: Supple   Respiratory: Clear to auscultation bilaterally, nonlabored respirations    Cardiovascular: RRR, no murmurs   Abdomen: benign, bilateral groin percutaneous access sites clean dry and intact without hematoma   Extremities: No clubbing, cyanosis, or edema        Pertinent  and/or Most Recent Results     LAB RESULTS:      Lab  10/23/24  0349 10/22/24  0836 10/22/24  0552   WBC 12.99*  --  11.55*   HEMOGLOBIN 11.2*  --  14.9   HEMATOCRIT 33.3*  --  44.9   PLATELETS 145  --  175   NEUTROS ABS 8.43*  --  5.97   IMMATURE GRANS (ABS) 0.07*  --  0.08*   LYMPHS ABS 3.66*  --  4.62*   MONOS ABS 0.77  --  0.71   EOS ABS 0.04  --  0.12   MCV 93.5  --  93.7   LACTATE  --  1.1  --    PROTIME 14.8  --  12.8   APTT 25.6  --  25.4         Lab 10/23/24  0349 10/22/24  0552   SODIUM 136 135*   POTASSIUM 3.9 4.2   CHLORIDE 102 99   CO2 21.4* 25.1   ANION GAP 12.6 10.9   BUN 12 20   CREATININE 0.81 1.16   EGFR 94.3 67.3   GLUCOSE 88 90   CALCIUM 8.7 9.7   MAGNESIUM 2.1 2.1   PHOSPHORUS 2.6 3.7         Lab 10/23/24  0349 10/22/24  0552   TOTAL PROTEIN 6.1 7.8   ALBUMIN 3.8 4.6   GLOBULIN 2.3 3.2   ALT (SGPT) 22 39   AST (SGOT) 15 26   BILIRUBIN 0.4 0.4   ALK PHOS 53 77         Lab 10/23/24  0349 10/22/24  0552   PROTIME 14.8 12.8   INR 1.14 0.94             Lab 10/22/24  0602 10/22/24  0552   ABO TYPING O O   RH TYPING Positive Positive   ANTIBODY SCREEN  --  Negative         Lab 10/22/24  0836   PH, ARTERIAL 7.354   PCO2, ARTERIAL 39.4   PO2 .6*   O2 SATURATION ART 99.8*   FIO2 60   HCO3 ART 21.9*   BASE EXCESS ART -3.3*     Brief Urine Lab Results       None          Microbiology Results (last 10 days)       ** No results found for the last 240 hours. **            XR Chest 1 View    Result Date: 10/22/2024  Impression: Impression: 1.Right IJ catheter as discussed. Potential kink at or near the skin entry site although this may be projectional. Correlate with catheter functionality. Electronically Signed: Topher Gilbert MD  10/22/2024 11:55 AM EDT  Workstation ID: [id number]               Labs Pending at Discharge:        Discharge Details        Discharge Medications        Continue These Medications        Instructions Start Date   aspirin 81 MG EC tablet   81 mg, Daily      clopidogrel 75 MG tablet  Commonly known as: PLAVIX   1 tablet, Daily       Entresto 24-26 MG tablet  Generic drug: sacubitril-valsartan   1 tablet, 2 Times Daily      HYDROcodone-acetaminophen  MG per tablet  Commonly known as: NORCO   1 tablet, Every 6 Hours PRN      Jardiance 10 MG tablet tablet  Generic drug: empagliflozin   1 tablet, Oral, Daily      multivitamin with minerals tablet tablet   1 tablet, Oral, Daily      multivitamin with minerals tablet tablet   1 tablet, Oral, Daily      rosuvastatin 40 MG tablet  Commonly known as: CRESTOR   1 tablet, Daily      vitamin B-12 500 MCG tablet  Commonly known as: CYANOCOBALAMIN   500 mcg, Oral, Daily               No Known Allergies      Discharge Disposition:  Home or Self Care    Diet:  Diet Instructions       Diet: Cardiac Diets; Healthy Heart (2-3 Na+); Thin (IDDSI 0)      Discharge Diet: Cardiac Diets    Cardiac Diet: Healthy Heart (2-3 Na+)    Fluid Consistency: Thin (IDDSI 0)            Condition: Stable for discharge home      Discharge Activity:     No lifting pushing or pulling greater than 5 to 10 pounds.  Patient may shower and wash gently over incisions and dab dry.  The patient is not to drive or operate heavy machinery while on narcotic medications.  Can advance diet as tolerated.  Can slowly increase ambulation and activity as tolerated.  Follow-up with the [facility] vascular surgery clinic as previously instructed.    CODE STATUS:  Code Status and Medical Interventions: CPR (Attempt to Resuscitate); Full Support   Ordered at: 10/22/24 1139     Level Of Support Discussed With:    Patient     Code Status (Patient has no pulse and is not breathing):    CPR (Attempt to Resuscitate)     Medical Interventions (Patient has pulse or is breathing):    Full Support         Future Appointments   Date Time Provider Department Center   11/5/2024  9:00 AM Brannon Simeon APRN [facility]       Additional Instructions for the Follow-ups that You Need to Schedule       Discharge Follow-up with Specified Provider: [facility] in 2-3 weeks as instructed.   As directed      To: [facility] in 2-3 weeks as instructed.              Electronically signed by Aaron Escobedo MD, 10/24/24, 3:29 PM EDT.

## 2024-10-25 NOTE — OUTREACH NOTE
Prep Survey      Flowsheet Row Responses   [facility] facility patient discharged from? [facility]   Is LACE score < 7 ? Yes   Eligibility Not Eligible   What are the reasons patient is not eligible? Other  [low risk readmit]   Does the patient have one of the following disease processes/diagnoses(primary or secondary)? Other   Prep survey completed? Yes            AVNI GIBSON - Registered Nurse

## 2024-10-30 LAB
QT INTERVAL: 366 MS
QTC INTERVAL: 384 MS

## 2024-11-05 ENCOUNTER — OFFICE VISIT (OUTPATIENT)
Dept: VASCULAR SURGERY | Facility: HOSPITAL | Age: 69
End: 2024-11-05
Payer: MEDICARE

## 2024-11-05 VITALS
OXYGEN SATURATION: 99 % | SYSTOLIC BLOOD PRESSURE: 136 MMHG | RESPIRATION RATE: 18 BRPM | HEART RATE: 62 BPM | TEMPERATURE: 98.1 F | DIASTOLIC BLOOD PRESSURE: 62 MMHG

## 2024-11-05 DIAGNOSIS — I71.43 INFRARENAL ABDOMINAL AORTIC ANEURYSM (AAA) WITHOUT RUPTURE: Primary | ICD-10-CM

## 2024-11-05 DIAGNOSIS — Z86.79 STATUS POST AAA (ABDOMINAL AORTIC ANEURYSM) REPAIR: ICD-10-CM

## 2024-11-05 DIAGNOSIS — Z98.890 STATUS POST AAA (ABDOMINAL AORTIC ANEURYSM) REPAIR: ICD-10-CM

## 2024-11-05 PROCEDURE — 99024 POSTOP FOLLOW-UP VISIT: CPT | Performed by: NURSE PRACTITIONER

## 2024-11-05 PROCEDURE — G0463 HOSPITAL OUTPT CLINIC VISIT: HCPCS | Performed by: NURSE PRACTITIONER

## 2024-11-05 NOTE — PROGRESS NOTES
[facility] Vascular Surgery Office Follow Up Note     Date of Encounter: 11/05/2024     MRN Number: [id number]  Name: Brenton Rogers  Phone Number: [phone]     Referred By: Aaron Escobedo MD  PCP: Louis Lea DO    Chief Complaint:    Chief Complaint   Patient presents with    Aortic Aneurysm     Patient is here as a surgical follow up s/p endovascular repair by Dr. Escobedo on 22 October . Surgical incision is well approximated .        Subjective      History of Present Illness:    Brenton Rogers is a 69 y.o. male presents for follow-up, he had a endovascular abdominal aortic aneurysm repair on 10/22/2024 for a 6 cm infrarenal aneurysm.  He is doing well, he does have some bruising and tenderness on the left side, denies any fever or chills. He is very active and would like to resume his cardio and strength workouts.  Former smoker who quit smoking in July 2023.  He takes Plavix, aspirin and a statin medication daily.  No other complaints at this time.    Review of Systems:  ROS  Review of Systems   Constitutional: Negative.   HENT: Negative.    Cardiovascular: Negative.    Respiratory: Negative.    Skin: Negative.    Musculoskeletal: Negative.    Gastrointestinal: Negative.    Neurological: Negative.    Psychiatric/Behavioral: Negative.      I have reviewed the following portions of the patient's history: problem list, current medications, allergies, past surgical history, past medical history, past social history, past family history, and ROS and confirm it's accurate.    Allergies:  No Known Allergies    Medications:      Current Outpatient Medications:     aspirin 81 MG EC tablet, Take 1 tablet by mouth Daily., Disp: , Rfl:     clopidogrel (PLAVIX) 75 MG tablet, Take 1 tablet by mouth Daily., Disp: , Rfl:     empagliflozin (Jardiance) 10 MG tablet tablet, Take 1 tablet by mouth Daily., Disp: , Rfl:     multivitamin with minerals (CENTRUM SILVER 50+MEN PO), Take 1 tablet by mouth  "Daily., Disp: , Rfl:     rosuvastatin (CRESTOR) 40 MG tablet, Take 1 tablet by mouth Daily., Disp: , Rfl:     sacubitril-valsartan (Entresto) 24-26 MG tablet, Take 1 tablet by mouth 2 (Two) Times a Day., Disp: , Rfl:     vitamin B-12 (CYANOCOBALAMIN) 500 MCG tablet, Take 1 tablet by mouth Daily., Disp: , Rfl:     HYDROcodone-acetaminophen (NORCO)  MG per tablet, Take 1 tablet by mouth Every 6 (Six) Hours As Needed for Moderate Pain. GIVEN BY PCP FOR LEG PAIN, Disp: , Rfl:     multivitamin with minerals (PRESERVISION AREDS PO), Take 1 tablet by mouth Daily., Disp: , Rfl:     History:   Past Medical History:   Diagnosis Date    Aneurysm     AAA- 6 CM- SEE'S DR ESCOBEDO    Broken bones     Cancer     PROSTATE CANCER    Hyperlipidemia     Hypertension     Leg pain     Myocardial infarction 2023    1 STENT- SEE'S DR FLORES IN [facility]       Past Surgical History:   Procedure Laterality Date    ABDOMINAL AORTIC ANEURYSM REPAIR WITH ENDOGRAFT N/A 10/22/2024    Procedure: ABDOMINAL AORTIC ANEURYSM REPAIR WITH ENDOGRAFT, POSSIBLE PERCUTANEOUS ACCESS;  Surgeon: Aaron [facility]obedo MD;  Location: [facility]  Service: Vascular;  Laterality: N/A;    CARDIAC CATHETERIZATION  2023    1 STENT PLACED    PROSTATECTOMY      5 YEARS AGO       Social History     Socioeconomic History    Marital status:    Tobacco Use    Smoking status: Former     Types: Cigarettes     Start date: 2023     Quit date: 1972     Years since quittin.8    Smokeless tobacco: Never    Tobacco comments:     Quit 2023   Vaping Use    Vaping status: Never Used   Substance and Sexual Activity    Alcohol use: Yes     Comment: ocassional    Drug use: Not Currently     Types: Marijuana, LSD, Amphetamines, \"Crack\" cocaine, Methamphetamines     Comment: LAST USED 80'S    Sexual activity: Defer        Family History   Problem Relation Age of Onset    Malig Hyperthermia Neg Hx        Objective     Physical Exam:  Vitals:    24 1033 "   BP: 136/62   BP Location: Right arm   Patient Position: Sitting   Cuff Size: Large Adult   Pulse: 62   Resp: 18   Temp: 98.1 °F (36.7 °C)   TempSrc: Temporal   SpO2: 99%   PainSc: 0-No pain      There is no height or weight on file to calculate BMI.    Physical Exam  Physical Exam  Constitutional:       Appearance:Normal.   HENT:      Head: Normocephalic and atraumatic.   Eyes:      Extraocular Movements: Extraocular movements intact.      Pupils: Pupils are equal, round, and reactive to light.   Cardiovascular:      Rate and Rhythm: Normal rate and regular rhythm.   Pulmonary:      Effort: Pulmonary effort is normal.      Abdominal:      Palpations: Abdomen is soft.   Musculoskeletal:      Cervical back: Normal range of motion and neck supple.   Skin:     General: Skin is warm and dry.   Left groin: Ecchymosis, surgical insertion site well-healed, mild induration, mild edema no erythema or drainage.  Right groin: Healing surgical insertion site, healing ecchymosis, no erythema or drainage.  Neurological:      General: No focal deficit present.      Mental Status: Alert and oriented to person, place, and time.     Imaging/Labs:    Assessment / Plan      Assessment / Plan:  Diagnoses and all orders for this visit:    1. Infrarenal abdominal aortic aneurysm (AAA) without rupture (Primary)  -     Cancel: CT Angiogram Abdomen Pelvis; Future  -     CT Angiogram Abdomen Pelvis; Future    2. Status post AAA (abdominal aortic aneurysm) repair  -     Cancel: CT Angiogram Abdomen Pelvis; Future  -     CT Angiogram Abdomen Pelvis; Future       Mr. Rogers is doing well following a endovascular abdominal aortic aneurysm repair for a 6 cm infrarenal AAA.  We have discussed in detail his activity, symptoms, testing and interventions, I have advised for him to wait approximately 4 more weeks before lifting anything over 20-30 lbs. We will follow up in 3 months a CTA Ab/pel, however I have explained should he have additional  concerns or worsening signs or symptoms that he may follow-up sooner. He will continue the Plavix, aspirin and statin medication daily.    I have answered all of his questions and he is in agreement with the plan at this time.  Thank you for allowing me to participate in your patient's care.    Patient Education: Lifestyle modifications and activity.        Follow Up:   No follow-ups on file.   Or sooner for any further concerns or worsening sign and symptoms. If unable to reach us in the office please dial 911 or go to the nearest emergency department.      Brannon PRAKASH  [facility] Vascular Surgery

## 2025-01-16 ENCOUNTER — PATIENT MESSAGE (OUTPATIENT)
Age: 70
End: 2025-01-16
Payer: MEDICARE

## 2025-02-04 ENCOUNTER — PATIENT MESSAGE (OUTPATIENT)
Age: 70
End: 2025-02-04
Payer: MEDICARE

## 2025-02-04 NOTE — TELEPHONE ENCOUNTER
I spoke with  Ken, I reviewed the CTA, The graft appears patent, no evidence of endoleak. Aneurysm sac measuring at 5.9 cm. We will plan on a follow up in one year with a CTA Ab/Pel. I have answered all his question and he voices understanding and is in agreement with the plan.

## (undated) DEVICE — RADIFOCUS GLIDECATH: Brand: GLIDECATH

## (undated) DEVICE — INTRO SHEATH DRYSEAL FLEX 12F4TO4.7MM 33CM

## (undated) DEVICE — KT ABG PROVENT DRY LITH/HEP STRIP PLS 1ML 25GX5/8IN

## (undated) DEVICE — INTRO SHEATH DRYSEAL FLEX 16F 5.3TO6.1MM 33CM

## (undated) DEVICE — GW AMPLTZ SUPERSTIFF STR .035IN 180CM

## (undated) DEVICE — LN INJ CONTRST FLXCIL HP F/M LL 1200PSI48

## (undated) DEVICE — STPCK 3WY MARQUIS STD/PRESS SWIVELNUT LT BLU

## (undated) DEVICE — PINNACLE R/O II INTRODUCER SHEATH WITH RADIOPAQUE MARKER: Brand: PINNACLE

## (undated) DEVICE — STERILE POLYISOPRENE POWDER-FREE SURGICAL GLOVES WITH EMOLLIENT COATING: Brand: PROTEXIS

## (undated) DEVICE — RADIFOCUS TORQUE DEVICE MULTI-TORQUE VISE: Brand: RADIFOCUS TORQUE DEVICE

## (undated) DEVICE — STERILE POLYISOPRENE POWDER-FREE SURGICAL GLOVES: Brand: PROTEXIS

## (undated) DEVICE — TOWEL,OR,DSP,ST,BLUE,STD,4/PK,20PK/CS: Brand: MEDLINE

## (undated) DEVICE — SYR INJ ILLUMENA W/SPK 150ML 1200PSI

## (undated) DEVICE — SYR INJ ILLUMENA W/HANDIFILL 150ML LTX STRL

## (undated) DEVICE — GLV SURG BIOGEL LTX PF 7

## (undated) DEVICE — SUT MNCRYL PLS ANTIB UD 4/0 PS2 18IN

## (undated) DEVICE — PERCLOSE PROGLIDE™ SUTURE-MEDIATED CLOSURE SYSTEM: Brand: PERCLOSE PROGLIDE™

## (undated) DEVICE — PROXIMATE RH ROTATING HEAD SKIN STAPLERS (35 WIDE) CONTAINS 35 STAINLESS STEEL STAPLES: Brand: PROXIMATE

## (undated) DEVICE — RADIFOCUS GLIDEWIRE ADVANTAGE GUIDEWIRE: Brand: GLIDEWIRE ADVANTAGE

## (undated) DEVICE — SHEET,DRAPE,70X85,STERILE: Brand: MEDLINE

## (undated) DEVICE — RADIFOCUS GLIDEWIRE: Brand: GLIDEWIRE

## (undated) DEVICE — KT INTRO MIC VSI SMOTH REG 4F 40CM 7CM

## (undated) DEVICE — CVR PROB ULTRASND CIVFLEX GEN/PURP TELESCP/FOLD 5.5X58IN LF

## (undated) DEVICE — SOL NACL 0.9PCT 1000ML

## (undated) DEVICE — ABDOMINAL VASCULAR-LF: Brand: MEDLINE INDUSTRIES, INC.

## (undated) DEVICE — SYR LUERLOK 30CC

## (undated) DEVICE — CATH SZ ACCUVU SEG/20CM PIG .038IN 5F 70CM

## (undated) DEVICE — BALN OCCL MOLDING .035 10TO37MM 4X90CM

## (undated) DEVICE — TOTAL TRAY, 16FR 10ML SIL FOLEY, URN: Brand: MEDLINE

## (undated) DEVICE — GLV SURG SENSICARE PI ORTHO SZ6.5 LF STRL